# Patient Record
Sex: FEMALE | Race: WHITE | NOT HISPANIC OR LATINO | URBAN - METROPOLITAN AREA
[De-identification: names, ages, dates, MRNs, and addresses within clinical notes are randomized per-mention and may not be internally consistent; named-entity substitution may affect disease eponyms.]

---

## 2017-01-23 ENCOUNTER — APPOINTMENT (RX ONLY)
Dept: URBAN - METROPOLITAN AREA CLINIC 104 | Facility: CLINIC | Age: 67
Setting detail: DERMATOLOGY
End: 2017-01-23

## 2017-01-23 DIAGNOSIS — L72.0 EPIDERMAL CYST: ICD-10-CM

## 2017-01-23 DIAGNOSIS — D18.0 HEMANGIOMA: ICD-10-CM

## 2017-01-23 PROBLEM — D18.01 HEMANGIOMA OF SKIN AND SUBCUTANEOUS TISSUE: Status: ACTIVE | Noted: 2017-01-23

## 2017-01-23 PROCEDURE — 99213 OFFICE O/P EST LOW 20 MIN: CPT

## 2017-01-23 PROCEDURE — ? ELECTRODESICCATION (COSMETIC)

## 2017-01-23 PROCEDURE — ? PRESCRIPTION

## 2017-01-23 RX ORDER — TRETINOIN 0.25 MG/G
CREAM TOPICAL QHS
Qty: 1 | Refills: 2 | Status: ERX | COMMUNITY
Start: 2017-01-23

## 2017-01-23 RX ADMIN — TRETINOIN APPLY: 0.25 CREAM TOPICAL at 00:00

## 2017-01-23 ASSESSMENT — LOCATION SIMPLE DESCRIPTION DERM
LOCATION SIMPLE: RIGHT CHEEK
LOCATION SIMPLE: GLABELLA

## 2017-01-23 ASSESSMENT — LOCATION DETAILED DESCRIPTION DERM
LOCATION DETAILED: GLABELLA
LOCATION DETAILED: RIGHT INFERIOR MEDIAL MALAR CHEEK
LOCATION DETAILED: RIGHT MEDIAL MALAR CHEEK

## 2017-01-23 ASSESSMENT — LOCATION ZONE DERM: LOCATION ZONE: FACE

## 2017-01-23 NOTE — PROCEDURE: ELECTRODESICCATION (COSMETIC)
Anesthesia Volume In Cc: 0
Detail Level: Detailed
Consent: The patient's consent was obtained including but not limited to risks of crusting, scabbing, blistering, scarring, darker or lighter pigmentary change, recurrence, incomplete removal and infection.
Post-Care Instructions: I reviewed with the patient in detail post-care instructions. Patient is to wear sunprotection, and avoid picking at any of the treated lesions. Pt may apply Vaseline to crusted or scabbing areas
Dominguez: 1

## 2017-01-23 NOTE — PROCEDURE: MIPS QUALITY
Detail Level: Detailed
Quality 111:Pneumonia Vaccination Status For Older Adults: Pneumococcal Vaccination Previously Received
Quality 110: Preventive Care And Screening: Influenza Immunization: Influenza immunization was not ordered or administered, reason not given

## 2017-01-30 ENCOUNTER — GENERIC CONVERSION - ENCOUNTER (OUTPATIENT)
Dept: OTHER | Facility: OTHER | Age: 67
End: 2017-01-30

## 2017-01-31 ENCOUNTER — ALLSCRIPTS OFFICE VISIT (OUTPATIENT)
Dept: OTHER | Facility: OTHER | Age: 67
End: 2017-01-31

## 2017-02-22 ENCOUNTER — ALLSCRIPTS OFFICE VISIT (OUTPATIENT)
Dept: OTHER | Facility: OTHER | Age: 67
End: 2017-02-22

## 2017-08-04 ENCOUNTER — ALLSCRIPTS OFFICE VISIT (OUTPATIENT)
Dept: OTHER | Facility: OTHER | Age: 67
End: 2017-08-04

## 2017-09-05 ENCOUNTER — TRANSCRIBE ORDERS (OUTPATIENT)
Dept: MAMMOGRAPHY | Facility: CLINIC | Age: 67
End: 2017-09-05

## 2017-09-05 DIAGNOSIS — R92.8 ABNORMAL MAMMOGRAM, UNSPECIFIED: Primary | ICD-10-CM

## 2017-09-07 ENCOUNTER — HOSPITAL ENCOUNTER (OUTPATIENT)
Dept: MAMMOGRAPHY | Facility: CLINIC | Age: 67
Discharge: HOME | End: 2017-09-07
Payer: COMMERCIAL

## 2017-09-07 ENCOUNTER — HOSPITAL ENCOUNTER (OUTPATIENT)
Dept: ULTRASOUND IMAGING | Facility: CLINIC | Age: 67
Discharge: HOME | End: 2017-09-07
Payer: COMMERCIAL

## 2017-09-07 DIAGNOSIS — R92.2 INCONCLUSIVE MAMMOGRAM: ICD-10-CM

## 2017-09-07 DIAGNOSIS — R92.8 ABNORMAL MAMMOGRAM, UNSPECIFIED: ICD-10-CM

## 2017-09-07 PROCEDURE — 77063 BREAST TOMOSYNTHESIS BI: CPT

## 2017-09-07 PROCEDURE — 76642 ULTRASOUND BREAST LIMITED: CPT

## 2017-09-07 PROCEDURE — G0202 SCR MAMMO BI INCL CAD: HCPCS

## 2017-11-17 ENCOUNTER — GENERIC CONVERSION - ENCOUNTER (OUTPATIENT)
Dept: OTHER | Facility: OTHER | Age: 67
End: 2017-11-17

## 2017-12-02 ENCOUNTER — GENERIC CONVERSION - ENCOUNTER (OUTPATIENT)
Dept: OTHER | Facility: OTHER | Age: 67
End: 2017-12-02

## 2017-12-02 ENCOUNTER — GENERIC CONVERSION - ENCOUNTER (OUTPATIENT)
Dept: FAMILY MEDICINE CLINIC | Facility: CLINIC | Age: 67
End: 2017-12-02

## 2018-01-12 VITALS
BODY MASS INDEX: 43.36 KG/M2 | DIASTOLIC BLOOD PRESSURE: 82 MMHG | WEIGHT: 254 LBS | SYSTOLIC BLOOD PRESSURE: 138 MMHG | HEIGHT: 64 IN

## 2018-01-13 VITALS
BODY MASS INDEX: 42.85 KG/M2 | WEIGHT: 251 LBS | DIASTOLIC BLOOD PRESSURE: 82 MMHG | HEIGHT: 64 IN | SYSTOLIC BLOOD PRESSURE: 126 MMHG

## 2018-01-14 VITALS
BODY MASS INDEX: 44.73 KG/M2 | WEIGHT: 262 LBS | SYSTOLIC BLOOD PRESSURE: 122 MMHG | DIASTOLIC BLOOD PRESSURE: 64 MMHG | HEIGHT: 64 IN

## 2018-01-14 VITALS
HEIGHT: 64 IN | BODY MASS INDEX: 43.36 KG/M2 | DIASTOLIC BLOOD PRESSURE: 82 MMHG | SYSTOLIC BLOOD PRESSURE: 132 MMHG | WEIGHT: 254 LBS

## 2018-01-29 DIAGNOSIS — K21.9 GASTROESOPHAGEAL REFLUX DISEASE WITHOUT ESOPHAGITIS: Primary | ICD-10-CM

## 2018-01-29 RX ORDER — LANSOPRAZOLE 30 MG/1
30 CAPSULE, DELAYED RELEASE ORAL DAILY
Qty: 90 CAPSULE | Refills: 1 | Status: SHIPPED | OUTPATIENT
Start: 2018-01-29 | End: 2018-04-03 | Stop reason: SDUPTHER

## 2018-01-29 RX ORDER — LANSOPRAZOLE 30 MG/1
1 CAPSULE, DELAYED RELEASE ORAL DAILY
COMMUNITY
End: 2018-01-29 | Stop reason: SDUPTHER

## 2018-02-01 ENCOUNTER — OFFICE VISIT (OUTPATIENT)
Dept: FAMILY MEDICINE CLINIC | Facility: CLINIC | Age: 68
End: 2018-02-01

## 2018-02-01 DIAGNOSIS — A69.20 LYME DISEASE: ICD-10-CM

## 2018-02-01 DIAGNOSIS — E03.9 HYPOTHYROIDISM, UNSPECIFIED TYPE: Primary | ICD-10-CM

## 2018-02-01 LAB — TSH SERPL DL<=0.05 MIU/L-ACNC: 2.97 UIU/ML (ref 0.36–3.74)

## 2018-02-01 PROCEDURE — 86617 LYME DISEASE ANTIBODY: CPT

## 2018-02-01 PROCEDURE — 36415 COLL VENOUS BLD VENIPUNCTURE: CPT

## 2018-02-01 PROCEDURE — 86618 LYME DISEASE ANTIBODY: CPT | Performed by: PHYSICIAN ASSISTANT

## 2018-02-01 PROCEDURE — 84443 ASSAY THYROID STIM HORMONE: CPT | Performed by: PHYSICIAN ASSISTANT

## 2018-02-01 RX ORDER — CLONAZEPAM 0.5 MG/1
1 TABLET ORAL DAILY PRN
COMMUNITY
End: 2018-02-12 | Stop reason: SDUPTHER

## 2018-02-01 RX ORDER — FEXOFENADINE HCL 180 MG/1
180 TABLET ORAL DAILY
COMMUNITY
End: 2018-09-05 | Stop reason: ALTCHOICE

## 2018-02-01 RX ORDER — EPINEPHRINE 0.3 MG/.3ML
INJECTION SUBCUTANEOUS
COMMUNITY

## 2018-02-01 RX ORDER — ALBUTEROL SULFATE 90 UG/1
2 AEROSOL, METERED RESPIRATORY (INHALATION) EVERY 4 HOURS PRN
COMMUNITY
End: 2018-08-21 | Stop reason: SDUPTHER

## 2018-02-01 RX ORDER — TOLTERODINE 2 MG/1
CAPSULE, EXTENDED RELEASE ORAL
COMMUNITY
End: 2018-02-12 | Stop reason: ALTCHOICE

## 2018-02-01 RX ORDER — VENLAFAXINE HYDROCHLORIDE 75 MG/1
1 CAPSULE, EXTENDED RELEASE ORAL DAILY
COMMUNITY
End: 2018-03-12 | Stop reason: SDUPTHER

## 2018-02-02 LAB
B BURGDOR IGG SER IA-ACNC: 3.84
B BURGDOR IGM SER IA-ACNC: 2.42

## 2018-02-03 LAB
B BURGDOR IGG PATRN SER IB-IMP: POSITIVE
B BURGDOR IGM PATRN SER IB-IMP: NEGATIVE
B BURGDOR18KD IGG SER QL IB: PRESENT
B BURGDOR23KD IGG SER QL IB: ABNORMAL
B BURGDOR23KD IGM SER QL IB: ABNORMAL
B BURGDOR28KD IGG SER QL IB: PRESENT
B BURGDOR30KD IGG SER QL IB: ABNORMAL
B BURGDOR39KD IGG SER QL IB: PRESENT
B BURGDOR39KD IGM SER QL IB: ABNORMAL
B BURGDOR41KD IGG SER QL IB: PRESENT
B BURGDOR41KD IGM SER QL IB: ABNORMAL
B BURGDOR45KD IGG SER QL IB: PRESENT
B BURGDOR58KD IGG SER QL IB: PRESENT
B BURGDOR66KD IGG SER QL IB: PRESENT
B BURGDOR93KD IGG SER QL IB: ABNORMAL

## 2018-02-07 NOTE — PROGRESS NOTES
Patient called about Lyme results, which were requested by Dr Svetlana Ruiz at [facility]  She has an appointment with him today  We will forward him the results and she is to bring this up with him

## 2018-02-09 ENCOUNTER — TELEPHONE (OUTPATIENT)
Dept: FAMILY MEDICINE CLINIC | Facility: CLINIC | Age: 68
End: 2018-02-09

## 2018-02-09 DIAGNOSIS — A69.20 LYME DISEASE: Primary | ICD-10-CM

## 2018-02-09 NOTE — TELEPHONE ENCOUNTER
Order performed   Please call and tell patient to set up appointment with Dr Susu Gallegos or one of his associates

## 2018-02-12 ENCOUNTER — OFFICE VISIT (OUTPATIENT)
Dept: FAMILY MEDICINE CLINIC | Facility: CLINIC | Age: 68
End: 2018-02-12
Payer: COMMERCIAL

## 2018-02-12 VITALS
WEIGHT: 250 LBS | HEART RATE: 90 BPM | OXYGEN SATURATION: 97 % | SYSTOLIC BLOOD PRESSURE: 122 MMHG | BODY MASS INDEX: 42.68 KG/M2 | DIASTOLIC BLOOD PRESSURE: 78 MMHG | HEIGHT: 64 IN

## 2018-02-12 DIAGNOSIS — F41.1 GENERALIZED ANXIETY DISORDER: Primary | ICD-10-CM

## 2018-02-12 DIAGNOSIS — E03.9 HYPOTHYROIDISM, UNSPECIFIED TYPE: ICD-10-CM

## 2018-02-12 DIAGNOSIS — I10 HYPERTENSION, BENIGN: ICD-10-CM

## 2018-02-12 DIAGNOSIS — A69.20 LYME DISEASE: ICD-10-CM

## 2018-02-12 DIAGNOSIS — M19.90 OSTEOARTHRITIS, UNSPECIFIED OSTEOARTHRITIS TYPE, UNSPECIFIED SITE: ICD-10-CM

## 2018-02-12 PROBLEM — F32.A DEPRESSION: Status: ACTIVE | Noted: 2017-01-30

## 2018-02-12 PROBLEM — R53.83 MALAISE AND FATIGUE: Status: ACTIVE | Noted: 2017-01-31

## 2018-02-12 PROBLEM — M79.671 PAIN, FOOT, CHRONIC, RIGHT: Status: ACTIVE | Noted: 2017-02-22

## 2018-02-12 PROBLEM — R00.2 PALPITATIONS: Status: ACTIVE | Noted: 2017-01-30

## 2018-02-12 PROBLEM — G62.9 NEUROPATHY: Status: ACTIVE | Noted: 2017-01-30

## 2018-02-12 PROBLEM — J30.2 SEASONAL ALLERGIES: Status: ACTIVE | Noted: 2017-01-30

## 2018-02-12 PROBLEM — R53.81 MALAISE AND FATIGUE: Status: ACTIVE | Noted: 2017-01-31

## 2018-02-12 PROBLEM — J45.20 MILD INTERMITTENT ASTHMA WITHOUT COMPLICATION: Status: ACTIVE | Noted: 2017-01-30

## 2018-02-12 PROBLEM — N32.81 OVERACTIVE BLADDER: Status: ACTIVE | Noted: 2017-01-30

## 2018-02-12 PROBLEM — G43.909 MIGRAINE: Status: ACTIVE | Noted: 2017-01-30

## 2018-02-12 PROBLEM — E78.2 MIXED HYPERLIPIDEMIA: Status: ACTIVE | Noted: 2017-01-30

## 2018-02-12 PROBLEM — G89.29 PAIN, FOOT, CHRONIC, RIGHT: Status: ACTIVE | Noted: 2017-02-22

## 2018-02-12 PROCEDURE — 99214 OFFICE O/P EST MOD 30 MIN: CPT | Performed by: PHYSICIAN ASSISTANT

## 2018-02-12 RX ORDER — BUTALBITAL, ACETAMINOPHEN AND CAFFEINE 50; 325; 40 MG/1; MG/1; MG/1
1 TABLET ORAL EVERY 4 HOURS PRN
Refills: 0 | COMMUNITY
Start: 2018-01-19 | End: 2018-09-05 | Stop reason: ALTCHOICE

## 2018-02-12 RX ORDER — ONDANSETRON 4 MG/1
1 TABLET, FILM COATED ORAL EVERY 6 HOURS PRN
Refills: 0 | COMMUNITY
Start: 2017-12-27 | End: 2018-05-22 | Stop reason: SDUPTHER

## 2018-02-12 RX ORDER — OLOPATADINE HYDROCHLORIDE 1 MG/ML
1 SOLUTION/ DROPS OPHTHALMIC 2 TIMES DAILY
COMMUNITY
End: 2019-03-04 | Stop reason: ALTCHOICE

## 2018-02-12 RX ORDER — IBUPROFEN 600 MG/1
600 TABLET ORAL 3 TIMES DAILY
Qty: 30 TABLET | Refills: 0 | Status: SHIPPED | OUTPATIENT
Start: 2018-02-12 | End: 2018-03-29 | Stop reason: SDUPTHER

## 2018-02-12 RX ORDER — LEVOTHYROXINE SODIUM 0.03 MG/1
25 TABLET ORAL DAILY
COMMUNITY
Start: 2012-04-09 | End: 2018-05-29 | Stop reason: SDUPTHER

## 2018-02-12 RX ORDER — GABAPENTIN 300 MG/1
1 CAPSULE ORAL 3 TIMES DAILY
COMMUNITY
Start: 2016-02-17

## 2018-02-12 RX ORDER — B-COMPLEX WITH VITAMIN C
1 TABLET ORAL DAILY
COMMUNITY
End: 2022-06-14

## 2018-02-12 RX ORDER — IBUPROFEN 600 MG/1
1 TABLET ORAL 3 TIMES DAILY
COMMUNITY
Start: 2015-07-09 | End: 2018-02-12 | Stop reason: SDUPTHER

## 2018-02-12 RX ORDER — CLONAZEPAM 0.5 MG/1
0.5 TABLET ORAL DAILY PRN
Qty: 20 TABLET | Refills: 0 | Status: SHIPPED | OUTPATIENT
Start: 2018-02-12 | End: 2018-05-21 | Stop reason: SDUPTHER
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
## 2018-03-12 DIAGNOSIS — F33.1 MODERATE EPISODE OF RECURRENT MAJOR DEPRESSIVE DISORDER (HCC): Primary | ICD-10-CM

## 2018-03-12 RX ORDER — VENLAFAXINE HYDROCHLORIDE 75 MG/1
75 CAPSULE, EXTENDED RELEASE ORAL DAILY
Qty: 30 CAPSULE | Refills: 1 | Status: SHIPPED | OUTPATIENT
Start: 2018-03-12 | End: 2018-05-21 | Stop reason: SDUPTHER

## 2018-03-28 ENCOUNTER — TELEPHONE (OUTPATIENT)
Dept: FAMILY MEDICINE CLINIC | Facility: CLINIC | Age: 68
End: 2018-03-28

## 2018-03-28 NOTE — TELEPHONE ENCOUNTER
Mina Whittington called to find out the name of the physician assistant who works under Dr Demetri Sutherland  She is aware that Dr Demetri Sutherland no longer works at [facility]  She needed Chantel's name to put on a form at [facility]  She will contact us in the near future regarding appointment set up   Tegan Ghotra

## 2018-03-29 DIAGNOSIS — M19.90 OSTEOARTHRITIS, UNSPECIFIED OSTEOARTHRITIS TYPE, UNSPECIFIED SITE: ICD-10-CM

## 2018-03-29 RX ORDER — IBUPROFEN 600 MG/1
600 TABLET ORAL 3 TIMES DAILY PRN
Qty: 180 TABLET | Refills: 0 | Status: SHIPPED | OUTPATIENT
Start: 2018-03-29 | End: 2018-04-03 | Stop reason: SDUPTHER

## 2018-04-03 ENCOUNTER — OFFICE VISIT (OUTPATIENT)
Dept: FAMILY MEDICINE CLINIC | Facility: CLINIC | Age: 68
End: 2018-04-03
Payer: COMMERCIAL

## 2018-04-03 VITALS
WEIGHT: 245 LBS | DIASTOLIC BLOOD PRESSURE: 62 MMHG | SYSTOLIC BLOOD PRESSURE: 118 MMHG | OXYGEN SATURATION: 98 % | HEART RATE: 75 BPM | BODY MASS INDEX: 39.37 KG/M2 | HEIGHT: 66 IN

## 2018-04-03 DIAGNOSIS — E03.9 HYPOTHYROIDISM, UNSPECIFIED TYPE: Primary | ICD-10-CM

## 2018-04-03 DIAGNOSIS — F41.1 GENERALIZED ANXIETY DISORDER: ICD-10-CM

## 2018-04-03 DIAGNOSIS — K21.9 GASTROESOPHAGEAL REFLUX DISEASE WITHOUT ESOPHAGITIS: ICD-10-CM

## 2018-04-03 DIAGNOSIS — I10 HYPERTENSION, BENIGN: ICD-10-CM

## 2018-04-03 DIAGNOSIS — M19.90 OSTEOARTHRITIS, UNSPECIFIED OSTEOARTHRITIS TYPE, UNSPECIFIED SITE: ICD-10-CM

## 2018-04-03 PROCEDURE — 99214 OFFICE O/P EST MOD 30 MIN: CPT | Performed by: PHYSICIAN ASSISTANT

## 2018-04-03 RX ORDER — IBUPROFEN 600 MG/1
600 TABLET ORAL 3 TIMES DAILY PRN
Qty: 180 TABLET | Refills: 3 | Status: SHIPPED | OUTPATIENT
Start: 2018-04-03 | End: 2018-08-26 | Stop reason: HOSPADM

## 2018-04-03 RX ORDER — LANSOPRAZOLE 30 MG/1
30 CAPSULE, DELAYED RELEASE ORAL DAILY
Qty: 90 CAPSULE | Refills: 3 | Status: SHIPPED | OUTPATIENT
Start: 2018-04-03 | End: 2018-04-11 | Stop reason: SINTOL

## 2018-04-03 RX ORDER — ELETRIPTAN HYDROBROMIDE 40 MG/1
40 TABLET, FILM COATED ORAL DAILY PRN
COMMUNITY
End: 2018-05-22 | Stop reason: SDUPTHER

## 2018-04-03 RX ORDER — TIZANIDINE HYDROCHLORIDE 2 MG/1
2 CAPSULE, GELATIN COATED ORAL DAILY
Refills: 0 | COMMUNITY
Start: 2018-02-19 | End: 2022-06-14

## 2018-04-03 RX ORDER — BUDESONIDE AND FORMOTEROL FUMARATE DIHYDRATE 80; 4.5 UG/1; UG/1
1 AEROSOL RESPIRATORY (INHALATION) DAILY
COMMUNITY

## 2018-04-03 NOTE — ASSESSMENT & PLAN NOTE
Patient continues to have significant anxiety  It was discussed to increase Effexor to 150 mg at last visit but patient forgot to do this  She is to start this increased dose as soon as possible  Continue clonazepam for breakthrough symptoms
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
## 2018-04-03 NOTE — ASSESSMENT & PLAN NOTE
Patient reports she did much better on prescription strength Prevacid than on over-the-counter generic Prevacid  Refill ordered    She is to return if she does not experience relief of symptoms in 2 weeks

## 2018-04-11 DIAGNOSIS — K21.9 GASTROESOPHAGEAL REFLUX DISEASE WITHOUT ESOPHAGITIS: Primary | ICD-10-CM

## 2018-04-11 RX ORDER — LANSOPRAZOLE 30 MG
30 CAPSULE,DELAYED RELEASE (ENTERIC COATED) ORAL DAILY
Qty: 90 CAPSULE | Refills: 1 | Status: SHIPPED | OUTPATIENT
Start: 2018-04-11 | End: 2018-10-28 | Stop reason: SDUPTHER

## 2018-04-11 NOTE — TELEPHONE ENCOUNTER
Pt called regarding recent refill of prevacid  [facility] gave her the generic which makes her vomit  Refilled brand [facility] to [facility]  Pt also requested referral for GI appt for today   Same done by Greg Lai

## 2018-05-05 ENCOUNTER — HOSPITAL ENCOUNTER (EMERGENCY)
Facility: HOSPITAL | Age: 68
Discharge: HOME | End: 2018-05-05
Attending: EMERGENCY MEDICINE | Admitting: EMERGENCY MEDICINE
Payer: COMMERCIAL

## 2018-05-05 VITALS
RESPIRATION RATE: 16 BRPM | SYSTOLIC BLOOD PRESSURE: 167 MMHG | OXYGEN SATURATION: 97 % | HEART RATE: 77 BPM | TEMPERATURE: 98.3 F | WEIGHT: 245 LBS | BODY MASS INDEX: 39.54 KG/M2 | DIASTOLIC BLOOD PRESSURE: 89 MMHG

## 2018-05-05 DIAGNOSIS — F43.29 STRESS AND ADJUSTMENT REACTION: Primary | ICD-10-CM

## 2018-05-05 LAB
ALBUMIN SERPL BCP-MCNC: 3.6 G/DL (ref 3.5–5)
ALP SERPL-CCNC: 126 U/L (ref 46–116)
ALT SERPL W P-5'-P-CCNC: 27 U/L (ref 12–78)
ANION GAP SERPL CALCULATED.3IONS-SCNC: 8 MMOL/L (ref 4–13)
AST SERPL W P-5'-P-CCNC: 22 U/L (ref 5–45)
BASOPHILS # BLD AUTO: 0.03 THOUSANDS/ΜL (ref 0–0.1)
BASOPHILS NFR BLD AUTO: 0 % (ref 0–1)
BILIRUB SERPL-MCNC: 0.37 MG/DL (ref 0.2–1)
BUN SERPL-MCNC: 11 MG/DL (ref 5–25)
CALCIUM SERPL-MCNC: 8.8 MG/DL (ref 8.3–10.1)
CHLORIDE SERPL-SCNC: 106 MMOL/L (ref 100–108)
CO2 SERPL-SCNC: 23 MMOL/L (ref 21–32)
CREAT SERPL-MCNC: 0.8 MG/DL (ref 0.6–1.3)
EOSINOPHIL # BLD AUTO: 0.35 THOUSAND/ΜL (ref 0–0.61)
EOSINOPHIL NFR BLD AUTO: 5 % (ref 0–6)
ERYTHROCYTE [DISTWIDTH] IN BLOOD BY AUTOMATED COUNT: 14.3 % (ref 11.6–15.1)
ETHANOL EXG-MCNC: 0 MG/DL
GFR SERPL CREATININE-BSD FRML MDRD: 75 ML/MIN/1.73SQ M
GLUCOSE SERPL-MCNC: 162 MG/DL (ref 65–140)
HCT VFR BLD AUTO: 41.3 % (ref 34.8–46.1)
HGB BLD-MCNC: 13.8 G/DL (ref 11.5–15.4)
LYMPHOCYTES # BLD AUTO: 2.91 THOUSANDS/ΜL (ref 0.6–4.47)
LYMPHOCYTES NFR BLD AUTO: 38 % (ref 14–44)
MCH RBC QN AUTO: 30.5 PG (ref 26.8–34.3)
MCHC RBC AUTO-ENTMCNC: 33.4 G/DL (ref 31.4–37.4)
MCV RBC AUTO: 91 FL (ref 82–98)
MONOCYTES # BLD AUTO: 0.61 THOUSAND/ΜL (ref 0.17–1.22)
MONOCYTES NFR BLD AUTO: 8 % (ref 4–12)
NEUTROPHILS # BLD AUTO: 3.74 THOUSANDS/ΜL (ref 1.85–7.62)
NEUTS SEG NFR BLD AUTO: 49 % (ref 43–75)
NRBC BLD AUTO-RTO: 0 /100 WBCS
PLATELET # BLD AUTO: 224 THOUSANDS/UL (ref 149–390)
PMV BLD AUTO: 10.9 FL (ref 8.9–12.7)
POTASSIUM SERPL-SCNC: 3.8 MMOL/L (ref 3.5–5.3)
PROT SERPL-MCNC: 7.5 G/DL (ref 6.4–8.2)
RBC # BLD AUTO: 4.53 MILLION/UL (ref 3.81–5.12)
SODIUM SERPL-SCNC: 137 MMOL/L (ref 136–145)
TSH SERPL DL<=0.05 MIU/L-ACNC: 2.41 UIU/ML (ref 0.36–3.74)
WBC # BLD AUTO: 7.64 THOUSAND/UL (ref 4.31–10.16)

## 2018-05-05 PROCEDURE — 99284 EMERGENCY DEPT VISIT MOD MDM: CPT

## 2018-05-05 PROCEDURE — 93005 ELECTROCARDIOGRAM TRACING: CPT | Performed by: EMERGENCY MEDICINE

## 2018-05-05 PROCEDURE — 82075 ASSAY OF BREATH ETHANOL: CPT | Performed by: EMERGENCY MEDICINE

## 2018-05-05 PROCEDURE — 80053 COMPREHEN METABOLIC PANEL: CPT | Performed by: EMERGENCY MEDICINE

## 2018-05-05 PROCEDURE — 85025 COMPLETE CBC W/AUTO DIFF WBC: CPT | Performed by: EMERGENCY MEDICINE

## 2018-05-05 PROCEDURE — 84443 ASSAY THYROID STIM HORMONE: CPT | Performed by: EMERGENCY MEDICINE

## 2018-05-05 PROCEDURE — 36415 COLL VENOUS BLD VENIPUNCTURE: CPT | Performed by: EMERGENCY MEDICINE

## 2018-05-05 RX ORDER — LISINOPRIL 5 MG/1
TABLET ORAL DAILY
COMMUNITY
End: 2018-08-01 | Stop reason: SINTOL
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
## 2018-05-05 NOTE — ED PROVIDER NOTES
History  Chief Complaint   Patient presents with    Psychiatric Evaluation     Pt was walking down [place] of ED taking her daughter's belongings to her  Her daughter is a current pt in ED  As the pt was walking buy, she was argiung on her phone became tearful and stated the she was going to drive her car off a road  The pt was asked if she needed help and was taken to an ED room for eval  She stated again when asked that she would drive her car off a road and that she was overwhelmed with the care of her daughter and   She is extremely tearful and anxious  Pt was a visitor of one of the patients (her daughter) here waiting for placement in a NH  She was bringing belongings for her daughter while arguing on the phone and it was overheard that she was going [place]  Pt started to cry and a nurse asked if she needed help  Pt states she is overwhelmed with her daughter and   She states she said it out of anger and does not actually want to drive off a mountain  History provided by:  Patient   used: No    Psychiatric Evaluation   Presenting symptoms: no agitation, no hallucinations, no self-mutilation and no suicidal thoughts    Presenting symptoms comment:  Anxiety    Onset quality:  Sudden  Progression:  Unchanged  Chronicity:  New  Context: not alcohol use and not drug abuse    Relieved by:  None tried  Worsened by:  Nothing  Ineffective treatments:  None tried  Associated symptoms: anxiety    Associated symptoms: no headaches    Risk factors: hx of mental illness        Prior to Admission Medications   Prescriptions Last Dose Informant Patient Reported? Taking?    Calcium Carbonate-Vitamin D (CALCIUM 600+D) 600-200 MG-UNIT TABS 5/5/2018 at 0900  Yes Yes   Sig: Take 1 tablet by mouth daily   EPINEPHrine (EPIPEN) 0 3 mg/0 3 mL SOAJ   Yes No   Sig: Inject into the shoulder, thigh, or buttocks   Linaclotide (LINZESS) 145 MCG CAPS 5/5/2018 at 0900 Yes Yes   Sig: Take 1 capsule (145mcg) by mouth once daily     PREVACID 30 MG capsule 5/5/2018 at 0900  No Yes   Sig: Take 1 capsule (30 mg total) by mouth daily   TiZANidine (ZANAFLEX) 2 MG capsule 5/5/2018 at 0900  Yes Yes   Sig: Take 2 mg by mouth daily   albuterol (PROVENTIL HFA,VENTOLIN HFA) 90 mcg/act inhaler   Yes No   Sig: Inhale 2 puffs every 4 (four) hours as needed for wheezing     budesonide-formoterol (SYMBICORT) 80-4 5 MCG/ACT inhaler 5/5/2018 at 0900  Yes Yes   Sig: Inhale 1 puff daily   butalbital-acetaminophen-caffeine (FIORICET,ESGIC) -40 mg per tablet   Yes No   Sig: Take 1 tablet by mouth every 4 (four) hours as needed   clonazePAM (KlonoPIN) 0 5 mg tablet 5/5/2018 at 0900  No Yes   Sig: Take 1 tablet (0 5 mg total) by mouth daily as needed for anxiety   eletriptan (RELPAX) 40 MG tablet   Yes No   Sig: Take 40 mg by mouth daily as needed   fexofenadine (ALLEGRA ALLERGY) 180 MG tablet   Yes No   Sig: Take 180 mg by mouth daily     gabapentin (NEURONTIN) 300 mg capsule 5/5/2018 at 0900  Yes Yes   Sig: Take 1 capsule by mouth 3 (three) times a day   ibuprofen (MOTRIN) 600 mg tablet   No No   Sig: Take 1 tablet (600 mg total) by mouth 3 (three) times a day as needed for mild pain   levothyroxine 25 mcg tablet 5/5/2018 at 0900  Yes Yes   Sig: Take 25 mcg by mouth daily   lisinopril (ZESTRIL) 5 mg tablet 5/5/2018 at 0900  Yes Yes   Sig: Daily   olopatadine (PATANOL) 0 1 % ophthalmic solution 5/5/2018 at 0900  Yes Yes   Sig: Administer 1 drop to both eyes 2 (two) times a day     ondansetron (ZOFRAN) 4 mg tablet   Yes No   Sig: Take 1 tablet by mouth every 6 (six) hours as needed for headaches or nausea   venlafaxine (EFFEXOR XR) 75 mg 24 hr capsule 5/5/2018 at 0900  No Yes   Sig: Take 1 capsule (75 mg total) by mouth daily      Facility-Administered Medications: None       Past Medical History:   Diagnosis Date    Anxiety     Arthritis     Depression     Disease of thyroid gland     GERD (gastroesophageal reflux disease)        No past surgical history on file  No family history on file  I have reviewed and agree with the history as documented  Social History   Substance Use Topics    Smoking status: Never Smoker    Smokeless tobacco: Never Used    Alcohol use Not on file        Review of Systems   Constitutional: Negative for chills and fever  Eyes: Negative for visual disturbance  Gastrointestinal: Negative for diarrhea, nausea and vomiting  Skin: Negative for rash  Neurological: Negative for tremors, facial asymmetry, speech difficulty, weakness, numbness and headaches  Psychiatric/Behavioral: Negative for agitation, behavioral problems, confusion, decreased concentration, dysphoric mood, hallucinations, self-injury, sleep disturbance and suicidal ideas  The patient is nervous/anxious  The patient is not hyperactive  All other systems reviewed and are negative  Physical Exam  ED Triage Vitals   Temperature Pulse Respirations Blood Pressure SpO2   05/05/18 1011 05/05/18 1011 05/05/18 1011 05/05/18 1011 05/05/18 1011   98 3 °F (36 8 °C) 88 20 (!) 195/92 97 %      Temp Source Heart Rate Source Patient Position - Orthostatic VS BP Location FiO2 (%)   05/05/18 1011 05/05/18 1416 05/05/18 1416 05/05/18 1416 --   Oral Monitor Lying Right arm       Pain Score       05/05/18 1011       No Pain           Orthostatic Vital Signs  Vitals:    05/05/18 1011 05/05/18 1416   BP: (!) 195/92 167/89   Pulse: 88 77   Patient Position - Orthostatic VS:  Lying       Physical Exam   Constitutional: She is oriented to person, place, and time  She appears well-developed and well-nourished  Non-toxic appearance  She does not have a sickly appearance  She does not appear ill  No distress  HENT:   Head: Normocephalic and atraumatic  Mouth/Throat: Oropharynx is clear and moist    Eyes: EOM are normal  Pupils are equal, round, and reactive to light  Neck: Normal range of motion  Neck supple  No JVD present  Cardiovascular: Normal rate, regular rhythm, S1 normal, S2 normal, normal heart sounds, intact distal pulses and normal pulses  Exam reveals no gallop and no friction rub  No murmur heard  Pulmonary/Chest: Effort normal and breath sounds normal  No respiratory distress  She has no wheezes  She has no rales  She exhibits no tenderness  Abdominal: Soft  Bowel sounds are normal  She exhibits no distension  There is no tenderness  There is no rebound and no guarding  Neurological: She is alert and oriented to person, place, and time  She has normal strength  No cranial nerve deficit or sensory deficit  Skin: Skin is warm and dry  No rash noted  She is not diaphoretic  No pallor  Psychiatric: Her mood appears anxious  Her speech is not rapid and/or pressured  She is not aggressive and not hyperactive  She expresses no homicidal and no suicidal ideation  Tearful   Nursing note and vitals reviewed  ED Medications  Medications - No data to display    Diagnostic Studies  Results Reviewed     Procedure Component Value Units Date/Time    Comprehensive metabolic panel [[id number]]  (Abnormal) Collected:  05/05/18 1032    Lab Status:  Final result Specimen:  Blood from Arm, Left Updated:  05/05/18 1107     Sodium 137 mmol/L      Potassium 3 8 mmol/L      Chloride 106 mmol/L      CO2 23 mmol/L      Anion Gap 8 mmol/L      BUN 11 mg/dL      Creatinine 0 80 mg/dL      Glucose 162 (H) mg/dL      Calcium 8 8 mg/dL      AST 22 U/L      ALT 27 U/L      Alkaline Phosphatase 126 (H) U/L      Total Protein 7 5 g/dL      Albumin 3 6 g/dL      Total Bilirubin 0 37 mg/dL      eGFR 75 ml/min/1 73sq m     Narrative:         National Kidney Disease Education Program recommendations are as follows:  GFR calculation is accurate only with a steady state creatinine  Chronic Kidney disease less than 60 ml/min/1 73 sq  meters  Kidney failure less than 15 ml/min/1 73 sq  meters      TSH, 3rd generation with T4 reflex [[id number]]  (Normal) Collected:  05/05/18 1032    Lab Status:  Final result Specimen:  Blood from Arm, Left Updated:  05/05/18 1107     TSH 3RD GENERATON 2 406 uIU/mL     Narrative:         Patients undergoing fluorescein dye angiography may retain small amounts of fluorescein in the body for 48-72 hours post procedure  Samples containing fluorescein can produce falsely depressed TSH values  If the patient had this procedure,a specimen should be resubmitted post fluorescein clearance            The recommended reference ranges for TSH during pregnancy are as follows:  First trimester 0 1 to 2 5 uIU/mL  Second trimester  0 2 to 3 0 uIU/mL  Third trimester 0 3 to 3 0 uIU/m      CBC and differential [[id number]] Collected:  05/05/18 1032    Lab Status:  Final result Specimen:  Blood from Arm, Left Updated:  05/05/18 1047     WBC 7 64 Thousand/uL      RBC 4 53 Million/uL      Hemoglobin 13 8 g/dL      Hematocrit 41 3 %      MCV 91 fL      MCH 30 5 pg      MCHC 33 4 g/dL      RDW 14 3 %      MPV 10 9 fL      Platelets 308 Thousands/uL      nRBC 0 /100 WBCs      Neutrophils Relative 49 %      Lymphocytes Relative 38 %      Monocytes Relative 8 %      Eosinophils Relative 5 %      Basophils Relative 0 %      Neutrophils Absolute 3 74 Thousands/µL      Lymphocytes Absolute 2 91 Thousands/µL      Monocytes Absolute 0 61 Thousand/µL      Eosinophils Absolute 0 35 Thousand/µL      Basophils Absolute 0 03 Thousands/µL     POCT alcohol breath test [[id number]]  (Normal) Resulted:  05/05/18 1032    Lab Status:  Final result Updated:  05/05/18 1032     EXTBreath Alcohol 0 000    Rapid drug screen, urine [[id number]]     Lab Status:  No result Specimen:  Urine     POCT urinalysis dipstick [[id number]]     Lab Status:  No result Specimen:  Urine                  No orders to display              Procedures  ECG 12 Lead Documentation  Date/Time: 5/5/2018 10:47 AM  Performed by: [facility] [employer]  Authorized by: [employer]     Indications / Diagnosis:  Psych eval  ECG reviewed by me, the ED Provider: yes    Patient location:  Bedside  Rate:     ECG rate:  70    ECG rate assessment: normal    Rhythm:     Rhythm: sinus rhythm    Ectopy:     Ectopy: none    QRS:     QRS axis:  Normal    QRS intervals:  Normal  ST segments:     ST segments:  Normal  T waves:     T waves: normal             Phone Contacts  ED Phone Contact    ED Course                               MDM  Number of Diagnoses or Management Options  Diagnosis management comments: Pt very overwhelmed with life - Will c/s crisis  Amount and/or Complexity of Data Reviewed  Clinical lab tests: reviewed and ordered  Tests in the medicine section of CPT®: ordered and reviewed      CritCare Time    Disposition  Final diagnoses:   Stress and adjustment reaction     Time reflects when diagnosis was documented in both MDM as applicable and the Disposition within this note     Time User Action Codes Description Comment    5/5/2018  1:49 PM Justina Jarquin 46 [F43 9] Stress     5/5/2018  1:49 PM Britton, [address]  [facility] Add [F43 29] Stress and adjustment reaction     5/5/2018  1:49 PM Sarai Jarquin Modify [F43 29] Stress and adjustment reaction     5/5/2018  1:49 PM Britton, [facility] [address] [F43 9] Stress       ED Disposition     ED Disposition Condition Comment    Discharge  Jacquestrue Perez discharge to home/self care      Condition at discharge: Good        MD Documentation      Most Recent Value   Sending MD Dr Parvez Lou    None       Discharge Medication List as of 5/5/2018  1:50 PM      CONTINUE these medications which have NOT CHANGED    Details   budesonide-formoterol (SYMBICORT) 80-4 5 MCG/ACT inhaler Inhale 1 puff daily, Historical Med      Calcium Carbonate-Vitamin D (CALCIUM 600+D) 600-200 MG-UNIT TABS Take 1 tablet by mouth daily, Historical Med      clonazePAM (KlonoPIN) 0 5 mg tablet Take 1 tablet (0 5 mg total) by mouth daily as needed for anxiety, Starting Mon 2/12/2018, Print      gabapentin (NEURONTIN) 300 mg capsule Take 1 capsule by mouth 3 (three) times a day, Starting Wed 2/17/2016, Historical Med      levothyroxine 25 mcg tablet Take 25 mcg by mouth daily, Starting Mon 4/9/2012, Historical Med      Linaclotide (LINZESS) 145 MCG CAPS Take 1 capsule (145mcg) by mouth once daily  , Historical Med      lisinopril (ZESTRIL) 5 mg tablet Daily, Historical Med      olopatadine (PATANOL) 0 1 % ophthalmic solution Administer 1 drop to both eyes 2 (two) times a day  , Historical Med      PREVACID 30 MG capsule Take 1 capsule (30 mg total) by mouth daily, Starting Wed 4/11/2018, Normal      TiZANidine (ZANAFLEX) 2 MG capsule Take 2 mg by mouth daily, Starting Mon 2/19/2018, Historical Med      venlafaxine (EFFEXOR XR) 75 mg 24 hr capsule Take 1 capsule (75 mg total) by mouth daily, Starting Mon 3/12/2018, Normal      albuterol (PROVENTIL HFA,VENTOLIN HFA) 90 mcg/act inhaler Inhale 2 puffs every 4 (four) hours as needed for wheezing  , Historical Med      butalbital-acetaminophen-caffeine (FIORICET,ESGIC) -40 mg per tablet Take 1 tablet by mouth every 4 (four) hours as needed, Starting Fri 1/19/2018, Historical Med      eletriptan (RELPAX) 40 MG tablet Take 40 mg by mouth daily as needed, Historical Med      EPINEPHrine (EPIPEN) 0 3 mg/0 3 mL SOAJ Inject into the shoulder, thigh, or buttocks, Historical Med      fexofenadine (ALLEGRA ALLERGY) 180 MG tablet Take 180 mg by mouth daily  , Historical Med      ibuprofen (MOTRIN) 600 mg tablet Take 1 tablet (600 mg total) by mouth 3 (three) times a day as needed for mild pain, Starting Tue 4/3/2018, Normal      ondansetron (ZOFRAN) 4 mg tablet Take 1 tablet by mouth every 6 (six) hours as needed for headaches or nausea, Starting Wed 12/27/2017, Historical Med           No discharge procedures on file      ED Provider  Electronically Signed by           Delon Davis 22, DO  05/05/18 3479

## 2018-05-05 NOTE — ED NOTES
Has made frequent statements that her  is going to be mad that she is here  She stated since he retired and is home they have been having problems  Her daughter she cares for has extensive medical problems including hx of substance abuse and hep C with liver disease  States she is overwhelmed and physically can no longer care for her  Patient has been admitted to [facility] for depression in the past   Is not currently under any psychiatric care- only her PCP  Recently saw the PA there and told her she felt like things were getting out of control  Patient calms with interactions, but remains tearful       Cindy Phillip RN  05/05/18 [address] [place], RN  05/05/18 6821

## 2018-05-05 NOTE — DISCHARGE INSTRUCTIONS
Stress   WHAT YOU NEED TO KNOW:   Stress is a feeling of tension or strain related to the events and pressures of everyday life  Learn to cope and control your stress to help you function in a healthy way  DISCHARGE INSTRUCTIONS:   Call 911 for any of the following:   · You feel like hurting yourself or someone else  · You feel you are overwhelmed and can no longer handle things by yourself  Contact your healthcare provider if:   · You have trouble coping with your stress  · Your symptoms cause problems in your relationships  · You feel depressed  · You have trouble controlling your anger  · You have started to use alcohol, illegal drugs, or prescription medicines, or you increase your current use  · You have questions or concerns about your condition or care  Ways to manage your stress:  Learn what causes you stress  Not all stress can be avoided  Instead, change how you cope with stress by doing any of the following:  · Learn relaxation techniques, such as yoga, meditation, or listening to music  Take at least 30 minutes a day to do something you enjoy  This may include taking a bath or reading a book  · Do deep breathing exercises during times of increased stress  Sit up straight and take a slow, deep breath in through your nose  Then breathe out slowly through your mouth  Take twice as long to breathe out as you do when you breathe in  Repeat this a few times until you feel calmer or more focused  · Set realistic goals for yourself  Make a list of tasks and prioritize them  Focus on one task at a time  · Talk to someone about things that upset you  Talk to a trusted friend, family member, or support group  Try to stop yourself when you think negative, angry, or discouraging thoughts  · Take time to exercise  Start slowly, such as walking 1 to 2 blocks each day  Stretch and relax your muscles often  Ask about the best exercise plan for you       · Eat a variety of healthy foods   Healthy foods include fruits, vegetables, whole-grain breads, low-fat dairy products, beans, lean meats, and fish  Follow up with your healthcare provider as directed:  Write down your questions so you remember to ask them during your visits  © 2017 [address] [employer] Information is for End User's use only and may not be sold, redistributed or otherwise used for commercial purposes  All illustrations and images included in CareNotes® are the copyrighted property of [employer]  or [address]  The above information is an  only  It is not intended as medical advice for individual conditions or treatments  Talk to your doctor, nurse or pharmacist before following any medical regimen to see if it is safe and effective for you

## 2018-05-06 LAB
ATRIAL RATE: 70 BPM
P AXIS: 32 DEGREES
PR INTERVAL: 186 MS
QRS AXIS: 28 DEGREES
QRSD INTERVAL: 96 MS
QT INTERVAL: 414 MS
QTC INTERVAL: 447 MS
T WAVE AXIS: 78 DEGREES
VENTRICULAR RATE: 70 BPM

## 2018-05-06 PROCEDURE — 93010 ELECTROCARDIOGRAM REPORT: CPT | Performed by: INTERNAL MEDICINE

## 2018-05-21 ENCOUNTER — APPOINTMENT (RX ONLY)
Dept: URBAN - METROPOLITAN AREA CLINIC 89 | Facility: CLINIC | Age: 68
Setting detail: DERMATOLOGY
End: 2018-05-21

## 2018-05-21 DIAGNOSIS — F41.1 GENERALIZED ANXIETY DISORDER: ICD-10-CM

## 2018-05-21 DIAGNOSIS — L70.0 ACNE VULGARIS: ICD-10-CM

## 2018-05-21 DIAGNOSIS — F33.1 MODERATE EPISODE OF RECURRENT MAJOR DEPRESSIVE DISORDER (HCC): ICD-10-CM

## 2018-05-21 PROCEDURE — ? ACNE SURGERY

## 2018-05-21 PROCEDURE — 10040 EXTRACTION: CPT

## 2018-05-21 RX ORDER — CLONAZEPAM 0.5 MG/1
0.5 TABLET ORAL 2 TIMES DAILY PRN
Qty: 30 TABLET | Refills: 0 | Status: SHIPPED | OUTPATIENT
Start: 2018-05-21 | End: 2018-09-24 | Stop reason: SDUPTHER

## 2018-05-21 RX ORDER — CLONAZEPAM 0.5 MG/1
0.5 TABLET ORAL DAILY PRN
Qty: 30 TABLET | Refills: 0 | Status: SHIPPED | OUTPATIENT
Start: 2018-05-21 | End: 2018-05-21 | Stop reason: SDUPTHER

## 2018-05-21 RX ORDER — VENLAFAXINE HYDROCHLORIDE 75 MG/1
75 CAPSULE, EXTENDED RELEASE ORAL DAILY
Qty: 30 CAPSULE | Refills: 0 | Status: SHIPPED | OUTPATIENT
Start: 2018-05-21 | End: 2018-05-22

## 2018-05-21 ASSESSMENT — LOCATION SIMPLE DESCRIPTION DERM
LOCATION SIMPLE: LEFT CHEEK
LOCATION SIMPLE: RIGHT FOREHEAD
LOCATION SIMPLE: LEFT LIP
LOCATION SIMPLE: NOSE
LOCATION SIMPLE: RIGHT CHEEK

## 2018-05-21 ASSESSMENT — LOCATION ZONE DERM
LOCATION ZONE: LIP
LOCATION ZONE: NOSE
LOCATION ZONE: FACE

## 2018-05-21 ASSESSMENT — LOCATION DETAILED DESCRIPTION DERM
LOCATION DETAILED: LEFT CENTRAL MALAR CHEEK
LOCATION DETAILED: LEFT LOWER CUTANEOUS LIP
LOCATION DETAILED: RIGHT INFERIOR MEDIAL FOREHEAD
LOCATION DETAILED: NASAL DORSUM
LOCATION DETAILED: RIGHT LATERAL MALAR CHEEK

## 2018-05-21 NOTE — TELEPHONE ENCOUNTER
Pt called stating her anxiety and depression is out of control and she needs it addressed  Gave patient appointment for tomorrow May 22  Reordered prescriptions for clonazepam and venlafaxine

## 2018-05-21 NOTE — HPI: PIMPLES (ACNE)
How Severe Is Your Acne?: mild
Is This A New Presentation, Or A Follow-Up?: Acne
Additional Comments (Use Complete Sentences): Acne facial #4

## 2018-05-21 NOTE — PROCEDURE: ACNE SURGERY
Detail Level: Zone
Hemostasis: Aluminum Chloride
Render Number Of Lesions Treated: no
Extraction Method: 11 blade
Prep Text (Optional): Prior to removal the treatment areas were prepped in the usual fashion.
Post-Care Instructions: I reviewed with the patient in detail post-care instructions. Patient is to keep the treatment areas dry overnight, and then apply bacitracin twice daily until healed. Patient may apply hydrogen peroxide soaks to remove any crusting.
Consent was obtained and risks were reviewed including but not limited to scarring, infection, bleeding, scabbing, incomplete removal, and allergy to anesthesia.
Acne Type: Milial cysts

## 2018-05-22 ENCOUNTER — OFFICE VISIT (OUTPATIENT)
Dept: FAMILY MEDICINE CLINIC | Facility: CLINIC | Age: 68
End: 2018-05-22
Payer: COMMERCIAL

## 2018-05-22 VITALS
HEART RATE: 77 BPM | OXYGEN SATURATION: 83 % | BODY MASS INDEX: 39.37 KG/M2 | TEMPERATURE: 100.1 F | WEIGHT: 245 LBS | HEIGHT: 66 IN | DIASTOLIC BLOOD PRESSURE: 90 MMHG | SYSTOLIC BLOOD PRESSURE: 140 MMHG

## 2018-05-22 DIAGNOSIS — G47.33 OSA (OBSTRUCTIVE SLEEP APNEA): ICD-10-CM

## 2018-05-22 DIAGNOSIS — F41.1 GENERALIZED ANXIETY DISORDER: ICD-10-CM

## 2018-05-22 DIAGNOSIS — F33.1 MODERATE EPISODE OF RECURRENT MAJOR DEPRESSIVE DISORDER (HCC): ICD-10-CM

## 2018-05-22 DIAGNOSIS — G43.909 MIGRAINE WITHOUT STATUS MIGRAINOSUS, NOT INTRACTABLE, UNSPECIFIED MIGRAINE TYPE: Primary | ICD-10-CM

## 2018-05-22 PROCEDURE — 99213 OFFICE O/P EST LOW 20 MIN: CPT | Performed by: PHYSICIAN ASSISTANT

## 2018-05-22 RX ORDER — CLONAZEPAM 0.5 MG/1
0.5 TABLET ORAL 2 TIMES DAILY
Qty: 90 TABLET | Refills: 0 | Status: SHIPPED | OUTPATIENT
Start: 2018-05-22 | End: 2018-05-29 | Stop reason: SDUPTHER

## 2018-05-22 RX ORDER — VENLAFAXINE HYDROCHLORIDE 75 MG/1
75 CAPSULE, EXTENDED RELEASE ORAL DAILY
Qty: 30 CAPSULE | Refills: 0 | Status: CANCELLED | OUTPATIENT
Start: 2018-05-22

## 2018-05-22 RX ORDER — LEVOTHYROXINE SODIUM 0.03 MG/1
TABLET ORAL
COMMUNITY
End: 2018-06-11 | Stop reason: SDUPTHER

## 2018-05-22 RX ORDER — ONDANSETRON 4 MG/1
4 TABLET, FILM COATED ORAL EVERY 6 HOURS PRN
Qty: 20 TABLET | Refills: 5 | Status: SHIPPED | OUTPATIENT
Start: 2018-05-22

## 2018-05-22 RX ORDER — FLUTICASONE PROPIONATE 50 MCG
SPRAY, SUSPENSION (ML) NASAL
COMMUNITY
End: 2018-12-12 | Stop reason: SDUPTHER

## 2018-05-22 RX ORDER — MOMETASONE FUROATE 50 UG/1
SPRAY, METERED NASAL
COMMUNITY
End: 2018-09-05 | Stop reason: ALTCHOICE

## 2018-05-22 RX ORDER — ELETRIPTAN HYDROBROMIDE 40 MG/1
40 TABLET, FILM COATED ORAL ONCE AS NEEDED
Qty: 10 TABLET | Refills: 9 | Status: SHIPPED | OUTPATIENT
Start: 2018-05-22 | End: 2022-06-14

## 2018-05-22 RX ORDER — VENLAFAXINE HYDROCHLORIDE 150 MG/1
150 TABLET, EXTENDED RELEASE ORAL
Qty: 90 TABLET | Refills: 3 | Status: SHIPPED | OUTPATIENT
Start: 2018-05-22 | End: 2018-08-21 | Stop reason: ALTCHOICE

## 2018-05-22 RX ORDER — ELETRIPTAN HYDROBROMIDE 40 MG/1
TABLET, FILM COATED ORAL
COMMUNITY
End: 2018-05-29 | Stop reason: SDUPTHER

## 2018-05-22 RX ORDER — CYCLOSPORINE 0.5 MG/ML
EMULSION OPHTHALMIC
COMMUNITY

## 2018-05-22 RX ORDER — BUDESONIDE AND FORMOTEROL FUMARATE DIHYDRATE 80; 4.5 UG/1; UG/1
AEROSOL RESPIRATORY (INHALATION)
COMMUNITY
End: 2018-05-29 | Stop reason: SDUPTHER

## 2018-05-22 NOTE — PROGRESS NOTES
Assessment/Plan:    Depression  Flare up secondary to running out of her medications several weeks ago  I suggested that in the future if she ever notices a problem with running out of medicine she should let us know as soon as possible to prevent it from getting to this point  As she has been moderately stable when taking her medicine regularly, I believe no adjustments are necessary at this time but patient just needs to be consistent with her medications    Generalized anxiety disorder  Same as depression       Diagnoses and all orders for this visit:    Migraine without status migrainosus, not intractable, unspecified migraine type  -     ondansetron (ZOFRAN) 4 mg tablet; Take 1 tablet (4 mg total) by mouth every 6 (six) hours as needed (as needed)  -     eletriptan (RELPAX) 40 MG tablet; Take 1 tablet (40 mg total) by mouth once as needed (as needed) for up to 1 dose    Moderate episode of recurrent major depressive disorder (HCC)  -     venlafaxine 150 MG TB24; Take 1 tablet (150 mg total) by mouth daily with breakfast    ALLA (obstructive sleep apnea)  -     Cpap DME    Generalized anxiety disorder  -     clonazePAM (KlonoPIN) 0 5 mg tablet; Take 1 tablet (0 5 mg total) by mouth 2 (two) times a day    Other orders  -     fluticasone (FLONASE) 50 mcg/act nasal spray; Insert 1 spray in each nostril once daily  -     mometasone (NASONEX) 50 mcg/act nasal spray; Insert 1 spray in each nostril as needed  PRN  -     cycloSPORINE (RESTASIS) 0 05 % ophthalmic emulsion; Instill 1 drop in each eye twice daily  -     budesonide-formoterol (SYMBICORT) 80-4 5 MCG/ACT inhaler; Inhale 1 puff as needed  PRN (Use as a Rescue Inhaler, when Asthma kicks in)  -     eletriptan (RELPAX) 40 MG tablet; Take 1 tablet (40mg) by mouth as needed  PRN  -     levothyroxine (SYNTHROID) 25 mcg tablet; Synthroid 25 mcg tablet  -     Cancel: venlafaxine (EFFEXOR XR) 75 mg 24 hr capsule;  Take 1 capsule (75 mg total) by mouth daily Subjective:      Patient ID: Karen Meeks is a 69 y o  female  66-year-old female presents for exacerbation of depression and anxiety  Reports that she ran out of her Effexor and Klonopin several weeks ago, reporting that the mail delivery system never brought her her refill  For last week she has noted a significant increase in her anxiety  She feels very on edge and is very upset with her temper  Her  calls her out on her mood which makes her feel worse  Yesterday I had called in refills to a local pharmacy which she picked up without an issue  She denies any suicidal ideation but is having fatigue and difficulty sleeping   and her are getting along well currently  Her daughter is doing well at rehab in [place] where she is undergoing PT  Patient reports overall her mood was controlled when she was taking her medications regularly        The following portions of the patient's history were reviewed and updated as appropriate: allergies, current medications, past family history, past medical history, past social history, past surgical history and problem list     Review of Systems   Constitutional: Positive for fatigue  Negative for chills and fever  Respiratory: Negative for cough and shortness of breath  Cardiovascular: Negative for chest pain and leg swelling  Psychiatric/Behavioral: Positive for agitation, decreased concentration, dysphoric mood and sleep disturbance  Negative for suicidal ideas  The patient is nervous/anxious  Objective:      /90 (BP Location: Left arm, Patient Position: Sitting, Cuff Size: Standard)   Pulse 77   Temp 100 1 °F (37 8 °C)   Ht 5' 6" (1 676 m)   Wt 111 kg (245 lb)   SpO2 (!) 83%   BMI 39 54 kg/m²          Physical Exam   Constitutional: She appears well-developed and well-nourished  Cardiovascular: Normal rate, regular rhythm and normal heart sounds      Pulmonary/Chest: Effort normal and breath sounds normal  No respiratory distress  She has no wheezes  She has no rales  Skin: She is not diaphoretic  Psychiatric: Her mood appears anxious

## 2018-05-22 NOTE — ASSESSMENT & PLAN NOTE
Flare up secondary to running out of her medications several weeks ago  I suggested that in the future if she ever notices a problem with running out of medicine she should let us know as soon as possible to prevent it from getting to this point    As she has been moderately stable when taking her medicine regularly, I believe no adjustments are necessary at this time but patient just needs to be consistent with her medications

## 2018-05-29 DIAGNOSIS — K58.9 IRRITABLE BOWEL SYNDROME, UNSPECIFIED TYPE: Primary | ICD-10-CM

## 2018-06-11 DIAGNOSIS — E03.9 ACQUIRED HYPOTHYROIDISM: Primary | ICD-10-CM

## 2018-06-12 RX ORDER — LEVOTHYROXINE SODIUM 25 MCG
25 TABLET ORAL DAILY
Qty: 90 TABLET | Refills: 1 | Status: SHIPPED | OUTPATIENT
Start: 2018-06-12 | End: 2018-12-09 | Stop reason: SDUPTHER

## 2018-08-01 ENCOUNTER — OFFICE VISIT (OUTPATIENT)
Dept: FAMILY MEDICINE CLINIC | Facility: CLINIC | Age: 68
End: 2018-08-01
Payer: COMMERCIAL

## 2018-08-01 VITALS
OXYGEN SATURATION: 96 % | SYSTOLIC BLOOD PRESSURE: 138 MMHG | WEIGHT: 238 LBS | HEIGHT: 66 IN | HEART RATE: 82 BPM | BODY MASS INDEX: 38.25 KG/M2 | TEMPERATURE: 97.8 F | DIASTOLIC BLOOD PRESSURE: 78 MMHG

## 2018-08-01 DIAGNOSIS — I10 HYPERTENSION, BENIGN: ICD-10-CM

## 2018-08-01 DIAGNOSIS — R05.3 CHRONIC COUGH: Primary | ICD-10-CM

## 2018-08-01 DIAGNOSIS — E03.9 HYPOTHYROIDISM, UNSPECIFIED TYPE: ICD-10-CM

## 2018-08-01 DIAGNOSIS — F33.2 SEVERE EPISODE OF RECURRENT MAJOR DEPRESSIVE DISORDER, WITHOUT PSYCHOTIC FEATURES (HCC): ICD-10-CM

## 2018-08-01 PROBLEM — E78.2 MIXED HYPERLIPIDEMIA: Status: RESOLVED | Noted: 2017-01-30 | Resolved: 2018-08-01

## 2018-08-01 PROCEDURE — 99214 OFFICE O/P EST MOD 30 MIN: CPT | Performed by: PHYSICIAN ASSISTANT

## 2018-08-01 PROCEDURE — 1101F PT FALLS ASSESS-DOCD LE1/YR: CPT | Performed by: PHYSICIAN ASSISTANT

## 2018-08-01 RX ORDER — BACLOFEN 10 MG/1
TABLET ORAL
COMMUNITY
Start: 2018-05-30

## 2018-08-01 NOTE — ASSESSMENT & PLAN NOTE
Pt to d/c lisinopril and return in 1 month for reevaluation  She should continue prevacid, allegra, and flonase daily  If still symptomatic, will order CXR and Rx singulair  Most likely allergies given nasal congestion and cough worse when laying down which correlates with PND   Will also consider PFTs if no relief

## 2018-08-01 NOTE — ASSESSMENT & PLAN NOTE
Borderline control at 138/78  Pt is to return in 1 month for reevaluation of chronic cough and will recheck BP at that time, especially since we will be d/c lisinopril   Consider adding diuretic

## 2018-08-01 NOTE — PROGRESS NOTES
Assessment/Plan:    Hypothyroidism  TSH in March stable  Continue synthroid 25 mcg    Hypertension, benign  Borderline control at 138/78  Pt is to return in 1 month for reevaluation of chronic cough and will recheck BP at that time, especially since we will be d/c lisinopril  Consider adding diuretic    Chronic cough  Pt to d/c lisinopril and return in 1 month for reevaluation  She should continue prevacid, allegra, and flonase daily  If still symptomatic, will order CXR and Rx singulair  Most likely allergies given nasal congestion and cough worse when laying down which correlates with PND  Will also consider PFTs if no relief    Depression  Patient is currently coping very well  Continue current management and return if any changes       Diagnoses and all orders for this visit:    Chronic cough    Severe episode of recurrent major depressive disorder, without psychotic features ([facility] )    Hypertension, benign    Hypothyroidism, unspecified type    Other orders  -     baclofen 10 mg tablet;           Subjective:      Patient ID: Karen Meeks is a 69 y o  female  70 y/o F hx depression, htn, hypothyroidism presents c/o chronic cough for several months  Is a dry, irritating cough worse when laying flat  She has associated nasal congestion but denies fever, chills, nightsweats, weight loss, SOB, CP, pain with breathing,ear pain, sinus pressure, sore throat  GERD has been well controlled with prevacid 30 mg daily  Takes allegra and flonase daily for allergies  Reports good control of asthma  Was started on lisinopril several months ago  No recent travel or known contact with TB  No hx malignancy  Pt doing well with depression on venlafaxine 150 mg  Her daughter who was struggling with liver failure and substance abuse passed yesterday but she has been coping well, stating she believes her daughter is in a better place  Sleeping and eating well, denies SI    Hypothyroidism has been stable on synthroid 25 mcg, TSH in May was normal  Blood pressure today 138/78, currently only on lisinopril 5 mg  No CP, HA, vision changes          The following portions of the patient's history were reviewed and updated as appropriate: allergies, current medications, past family history, past medical history, past social history, past surgical history and problem list     Review of Systems   Constitutional: Negative for diaphoresis, fatigue and fever  HENT: Negative for congestion, ear pain, hearing loss, postnasal drip, rhinorrhea and sore throat  Eyes: Negative for pain, redness and visual disturbance  Respiratory: Positive for cough  Negative for shortness of breath and wheezing  Cardiovascular: Negative for chest pain, palpitations and leg swelling  Gastrointestinal: Negative for anal bleeding, constipation, diarrhea, nausea and vomiting  Endocrine: Negative for polydipsia and polyuria  Genitourinary: Negative for dysuria, flank pain and hematuria  Musculoskeletal: Negative for arthralgias, back pain, joint swelling and myalgias  Skin: Negative for pallor, rash and wound  Neurological: Negative for dizziness, syncope, numbness and headaches  Hematological: Negative for adenopathy  Does not bruise/bleed easily  Psychiatric/Behavioral: Positive for dysphoric mood  Negative for decreased concentration, sleep disturbance and suicidal ideas  The patient is not nervous/anxious  Objective:      /78   Pulse 82   Temp 97 8 °F (36 6 °C)   Ht 5' 6" (1 676 m)   Wt 108 kg (238 lb)   SpO2 96%   BMI 38 41 kg/m²          Physical Exam   Constitutional: She is oriented to person, place, and time  She appears well-developed and well-nourished  No distress  HENT:   Head: Normocephalic and atraumatic  Mouth/Throat: Oropharynx is clear and moist    Post nasal drip   Eyes: Conjunctivae and EOM are normal  Pupils are equal, round, and reactive to light  Right eye exhibits no discharge   Left eye exhibits no discharge  No scleral icterus  Neck: Normal range of motion  Neck supple  No thyromegaly present  Cardiovascular: Normal rate, regular rhythm and normal heart sounds  Exam reveals no gallop and no friction rub  No murmur heard  Pulmonary/Chest: Effort normal and breath sounds normal  No respiratory distress  She has no wheezes  She has no rales  Musculoskeletal: Normal range of motion  She exhibits no edema  Lymphadenopathy:     She has no cervical adenopathy  Neurological: She is alert and oriented to person, place, and time  No cranial nerve deficit  Skin: Skin is warm and dry  No rash noted  She is not diaphoretic  No erythema  No pallor

## 2018-08-02 ENCOUNTER — TELEPHONE (OUTPATIENT)
Dept: FAMILY MEDICINE CLINIC | Facility: CLINIC | Age: 68
End: 2018-08-02

## 2018-08-15 ENCOUNTER — TRANSCRIBE ORDERS (OUTPATIENT)
Dept: ADMINISTRATIVE | Facility: HOSPITAL | Age: 68
End: 2018-08-15

## 2018-08-15 DIAGNOSIS — Z12.39 SCREENING BREAST EXAMINATION: Primary | ICD-10-CM

## 2018-08-16 ENCOUNTER — DOCUMENTATION (OUTPATIENT)
Dept: FAMILY MEDICINE CLINIC | Facility: CLINIC | Age: 68
End: 2018-08-16

## 2018-08-16 LAB — HEPATITIS C ANTIBODY (HISTORICAL): NEGATIVE

## 2018-08-21 ENCOUNTER — HOSPITAL ENCOUNTER (EMERGENCY)
Facility: HOSPITAL | Age: 68
Discharge: HOME | End: 2018-08-21
Attending: EMERGENCY MEDICINE | Admitting: EMERGENCY MEDICINE
Payer: COMMERCIAL

## 2018-08-21 ENCOUNTER — OFFICE VISIT (OUTPATIENT)
Dept: FAMILY MEDICINE CLINIC | Facility: CLINIC | Age: 68
End: 2018-08-21
Payer: COMMERCIAL

## 2018-08-21 ENCOUNTER — HOSPITAL ENCOUNTER (OUTPATIENT)
Dept: CT IMAGING | Facility: HOSPITAL | Age: 68
Discharge: HOME | End: 2018-08-21
Payer: COMMERCIAL

## 2018-08-21 VITALS
OXYGEN SATURATION: 98 % | SYSTOLIC BLOOD PRESSURE: 136 MMHG | BODY MASS INDEX: 37.72 KG/M2 | TEMPERATURE: 97.9 F | RESPIRATION RATE: 18 BRPM | HEART RATE: 64 BPM | DIASTOLIC BLOOD PRESSURE: 60 MMHG | WEIGHT: 233.69 LBS

## 2018-08-21 VITALS
DIASTOLIC BLOOD PRESSURE: 80 MMHG | SYSTOLIC BLOOD PRESSURE: 140 MMHG | WEIGHT: 234 LBS | TEMPERATURE: 98.5 F | OXYGEN SATURATION: 98 % | BODY MASS INDEX: 37.61 KG/M2 | HEART RATE: 70 BPM | HEIGHT: 66 IN

## 2018-08-21 DIAGNOSIS — N39.0 UTI (URINARY TRACT INFECTION): Primary | ICD-10-CM

## 2018-08-21 DIAGNOSIS — R10.9 ACUTE RIGHT FLANK PAIN: ICD-10-CM

## 2018-08-21 DIAGNOSIS — N39.0 URINARY TRACT INFECTION, ACUTE: Primary | ICD-10-CM

## 2018-08-21 DIAGNOSIS — N12 PYELONEPHRITIS: ICD-10-CM

## 2018-08-21 DIAGNOSIS — R10.9 RIGHT FLANK PAIN: ICD-10-CM

## 2018-08-21 DIAGNOSIS — N39.0 URINARY TRACT INFECTION, CHRONIC: ICD-10-CM

## 2018-08-21 LAB
ALBUMIN SERPL BCP-MCNC: 3.7 G/DL (ref 3.5–5)
ALP SERPL-CCNC: 114 U/L (ref 46–116)
ALT SERPL W P-5'-P-CCNC: 25 U/L (ref 12–78)
ANION GAP SERPL CALCULATED.3IONS-SCNC: 6 MMOL/L (ref 4–13)
APTT PPP: 28 SECONDS (ref 24–36)
AST SERPL W P-5'-P-CCNC: 20 U/L (ref 5–45)
BACTERIA UR QL AUTO: ABNORMAL /HPF
BASOPHILS # BLD AUTO: 0.03 THOUSANDS/ΜL (ref 0–0.1)
BASOPHILS NFR BLD AUTO: 0 % (ref 0–1)
BILIRUB SERPL-MCNC: 0.36 MG/DL (ref 0.2–1)
BILIRUB UR QL STRIP: NEGATIVE
BUN SERPL-MCNC: 13 MG/DL (ref 5–25)
CALCIUM SERPL-MCNC: 8.9 MG/DL (ref 8.3–10.1)
CHLORIDE SERPL-SCNC: 104 MMOL/L (ref 100–108)
CLARITY UR: ABNORMAL
CO2 SERPL-SCNC: 29 MMOL/L (ref 21–32)
COLOR UR: YELLOW
CREAT SERPL-MCNC: 0.74 MG/DL (ref 0.6–1.3)
EOSINOPHIL # BLD AUTO: 0.35 THOUSAND/ΜL (ref 0–0.61)
EOSINOPHIL NFR BLD AUTO: 4 % (ref 0–6)
ERYTHROCYTE [DISTWIDTH] IN BLOOD BY AUTOMATED COUNT: 14.4 % (ref 11.6–15.1)
GFR SERPL CREATININE-BSD FRML MDRD: 83 ML/MIN/1.73SQ M
GLUCOSE SERPL-MCNC: 120 MG/DL (ref 65–140)
GLUCOSE UR STRIP-MCNC: NEGATIVE MG/DL
HCT VFR BLD AUTO: 38.3 % (ref 34.8–46.1)
HGB BLD-MCNC: 12.8 G/DL (ref 11.5–15.4)
HGB UR QL STRIP.AUTO: ABNORMAL
INR PPP: 0.95 (ref 0.86–1.17)
KETONES UR STRIP-MCNC: NEGATIVE MG/DL
LEUKOCYTE ESTERASE UR QL STRIP: ABNORMAL
LYMPHOCYTES # BLD AUTO: 3.54 THOUSANDS/ΜL (ref 0.6–4.47)
LYMPHOCYTES NFR BLD AUTO: 38 % (ref 14–44)
MCH RBC QN AUTO: 31.1 PG (ref 26.8–34.3)
MCHC RBC AUTO-ENTMCNC: 33.4 G/DL (ref 31.4–37.4)
MCV RBC AUTO: 93 FL (ref 82–98)
MONOCYTES # BLD AUTO: 0.65 THOUSAND/ΜL (ref 0.17–1.22)
MONOCYTES NFR BLD AUTO: 7 % (ref 4–12)
NEUTROPHILS # BLD AUTO: 4.88 THOUSANDS/ΜL (ref 1.85–7.62)
NEUTS SEG NFR BLD AUTO: 52 % (ref 43–75)
NITRITE UR QL STRIP: POSITIVE
NON-SQ EPI CELLS URNS QL MICRO: ABNORMAL /HPF
NRBC BLD AUTO-RTO: 0 /100 WBCS
PH UR STRIP.AUTO: 5.5 [PH] (ref 4.5–8)
PLATELET # BLD AUTO: 250 THOUSANDS/UL (ref 149–390)
PMV BLD AUTO: 10.5 FL (ref 8.9–12.7)
POTASSIUM SERPL-SCNC: 3.4 MMOL/L (ref 3.5–5.3)
PROT SERPL-MCNC: 7.4 G/DL (ref 6.4–8.2)
PROT UR STRIP-MCNC: NEGATIVE MG/DL
PROTHROMBIN TIME: 12.8 SECONDS (ref 11.8–14.2)
RBC # BLD AUTO: 4.12 MILLION/UL (ref 3.81–5.12)
RBC #/AREA URNS AUTO: ABNORMAL /HPF
SL AMB  POCT GLUCOSE, UA: NEGATIVE
SL AMB LEUKOCYTE ESTERASE,UA: ABNORMAL
SL AMB POCT BILIRUBIN,UA: NEGATIVE
SL AMB POCT BLOOD,UA: 6.5
SL AMB POCT CLARITY,UA: ABNORMAL
SL AMB POCT COLOR,UA: YELLOW
SL AMB POCT KETONES,UA: NEGATIVE
SL AMB POCT NITRITE,UA: NEGATIVE
SL AMB POCT PH,UA: 5
SL AMB POCT SPECIFIC GRAVITY,UA: 1.01
SL AMB POCT URINE PROTEIN: NEGATIVE
SL AMB POCT UROBILINOGEN: NEGATIVE
SODIUM SERPL-SCNC: 139 MMOL/L (ref 136–145)
SP GR UR STRIP.AUTO: >=1.03 (ref 1–1.03)
UROBILINOGEN UR QL STRIP.AUTO: 0.2 E.U./DL
WBC # BLD AUTO: 9.45 THOUSAND/UL (ref 4.31–10.16)
WBC #/AREA URNS AUTO: ABNORMAL /HPF

## 2018-08-21 PROCEDURE — 85025 COMPLETE CBC W/AUTO DIFF WBC: CPT | Performed by: EMERGENCY MEDICINE

## 2018-08-21 PROCEDURE — 99284 EMERGENCY DEPT VISIT MOD MDM: CPT

## 2018-08-21 PROCEDURE — 85730 THROMBOPLASTIN TIME PARTIAL: CPT | Performed by: EMERGENCY MEDICINE

## 2018-08-21 PROCEDURE — 81003 URINALYSIS AUTO W/O SCOPE: CPT | Performed by: PHYSICIAN ASSISTANT

## 2018-08-21 PROCEDURE — 81001 URINALYSIS AUTO W/SCOPE: CPT | Performed by: EMERGENCY MEDICINE

## 2018-08-21 PROCEDURE — 87077 CULTURE AEROBIC IDENTIFY: CPT | Performed by: EMERGENCY MEDICINE

## 2018-08-21 PROCEDURE — 36415 COLL VENOUS BLD VENIPUNCTURE: CPT | Performed by: EMERGENCY MEDICINE

## 2018-08-21 PROCEDURE — 87086 URINE CULTURE/COLONY COUNT: CPT | Performed by: PHYSICIAN ASSISTANT

## 2018-08-21 PROCEDURE — 96375 TX/PRO/DX INJ NEW DRUG ADDON: CPT

## 2018-08-21 PROCEDURE — 87086 URINE CULTURE/COLONY COUNT: CPT | Performed by: EMERGENCY MEDICINE

## 2018-08-21 PROCEDURE — 87186 SC STD MICRODIL/AGAR DIL: CPT | Performed by: PHYSICIAN ASSISTANT

## 2018-08-21 PROCEDURE — 96365 THER/PROPH/DIAG IV INF INIT: CPT

## 2018-08-21 PROCEDURE — 80053 COMPREHEN METABOLIC PANEL: CPT | Performed by: EMERGENCY MEDICINE

## 2018-08-21 PROCEDURE — 74176 CT ABD & PELVIS W/O CONTRAST: CPT

## 2018-08-21 PROCEDURE — 87077 CULTURE AEROBIC IDENTIFY: CPT | Performed by: PHYSICIAN ASSISTANT

## 2018-08-21 PROCEDURE — 87186 SC STD MICRODIL/AGAR DIL: CPT | Performed by: EMERGENCY MEDICINE

## 2018-08-21 PROCEDURE — 87181 SC STD AGAR DILUTION PER AGT: CPT | Performed by: EMERGENCY MEDICINE

## 2018-08-21 PROCEDURE — 99214 OFFICE O/P EST MOD 30 MIN: CPT | Performed by: PHYSICIAN ASSISTANT

## 2018-08-21 PROCEDURE — 3008F BODY MASS INDEX DOCD: CPT | Performed by: PHYSICIAN ASSISTANT

## 2018-08-21 PROCEDURE — 85610 PROTHROMBIN TIME: CPT | Performed by: EMERGENCY MEDICINE

## 2018-08-21 RX ORDER — ALBUTEROL SULFATE 90 UG/1
AEROSOL, METERED RESPIRATORY (INHALATION)
COMMUNITY

## 2018-08-21 RX ORDER — FENTANYL CITRATE 50 UG/ML
50 INJECTION, SOLUTION INTRAMUSCULAR; INTRAVENOUS ONCE
Status: COMPLETED | OUTPATIENT
Start: 2018-08-21 | End: 2018-08-21

## 2018-08-21 RX ORDER — CIPROFLOXACIN 500 MG/1
500 TABLET, FILM COATED ORAL
COMMUNITY
Start: 2018-08-19 | End: 2018-08-26 | Stop reason: HOSPADM

## 2018-08-21 RX ORDER — KETOROLAC TROMETHAMINE 30 MG/ML
15 INJECTION, SOLUTION INTRAMUSCULAR; INTRAVENOUS ONCE
Status: COMPLETED | OUTPATIENT
Start: 2018-08-21 | End: 2018-08-21

## 2018-08-21 RX ORDER — TRAMADOL HYDROCHLORIDE 50 MG/1
50 TABLET ORAL EVERY 6 HOURS PRN
Qty: 20 TABLET | Refills: 0 | Status: SHIPPED | OUTPATIENT
Start: 2018-08-21 | End: 2018-08-26

## 2018-08-21 RX ORDER — LEVOFLOXACIN 750 MG/1
750 TABLET ORAL DAILY
Qty: 5 TABLET | Refills: 0 | Status: SHIPPED | OUTPATIENT
Start: 2018-08-21 | End: 2018-08-26 | Stop reason: HOSPADM

## 2018-08-21 RX ORDER — PEG-3350, SODIUM SULFATE, SODIUM CHLORIDE, POTASSIUM CHLORIDE, SODIUM ASCORBATE AND ASCORBIC ACID 7.5-2.691G
KIT ORAL
COMMUNITY
End: 2018-09-05 | Stop reason: ALTCHOICE

## 2018-08-21 RX ORDER — ERYTHROMYCIN 5 MG/G
OINTMENT OPHTHALMIC
COMMUNITY
End: 2018-08-26 | Stop reason: HOSPADM

## 2018-08-21 RX ORDER — VENLAFAXINE HYDROCHLORIDE 75 MG/1
CAPSULE, EXTENDED RELEASE ORAL
COMMUNITY
End: 2018-12-19 | Stop reason: DRUGHIGH

## 2018-08-21 RX ORDER — ERYTHROMYCIN 5 MG/G
OINTMENT OPHTHALMIC
COMMUNITY
End: 2018-08-21 | Stop reason: SDUPTHER

## 2018-08-21 RX ADMIN — FENTANYL CITRATE 50 MCG: 50 INJECTION, SOLUTION INTRAMUSCULAR; INTRAVENOUS at 22:08

## 2018-08-21 RX ADMIN — CEFTRIAXONE 1000 MG: 1 INJECTION, POWDER, FOR SOLUTION INTRAMUSCULAR; INTRAVENOUS at 22:35

## 2018-08-21 RX ADMIN — KETOROLAC TROMETHAMINE 15 MG: 30 INJECTION, SOLUTION INTRAMUSCULAR at 22:07

## 2018-08-21 NOTE — PROGRESS NOTES
Assessment/Plan:    Acute right flank pain  Concern for complicated UTI verses renal calculus  Stat CT ordered  If positive for stone, will call in Toradol as well as Flomax to help with stone passage  If positive for pyelonephritis, patient understands that she will be instructed to go to the ER due to failure to respond to Cipro       Diagnoses and all orders for this visit:    Urinary tract infection, acute    Acute right flank pain  -     CT abdomen pelvis wo contrast; Future    Urinary tract infection, chronic  -     Urine culture  -     POCT urine dip auto non-scope    Other orders  -     albuterol (PROAIR HFA) 90 mcg/act inhaler; Inhale 1 puff once daily  Subjective:      Patient ID: Stevie Carrasco is a 69 y o  female  56-year-old female presents complaining of right flank pain for 5 days  Reports symptoms have been worsening  Feels like a constant ache but is sometimes sharp and stabbing  She was seen at [facility] on Sunday and was diagnosed with UTI with positive leukocytes and blood on dipstick  Urine culture results not yet available  She was placed on Cipro 500 mg b i d  for 5 days  She has not noticed any improvement in symptoms  She denies any dysuria, urinary urgency or frequency, hematuria, fever, chills, nausea or vomiting  She does have a history of 2 kidney stones in the past   Urine dip today again positive for leukocytes and blood        The following portions of the patient's history were reviewed and updated as appropriate: allergies, current medications, past family history, past medical history, past social history, past surgical history and problem list     Review of Systems   Constitutional: Negative for chills and fever  Gastrointestinal: Positive for abdominal pain  Negative for nausea and vomiting  Genitourinary: Positive for flank pain  Negative for difficulty urinating, dysuria, frequency, hematuria, pelvic pain and urgency     Neurological: Negative for light-headedness  Objective:      /80   Pulse 70   Temp 98 5 °F (36 9 °C)   Ht 5' 6" (1 676 m)   Wt 106 kg (234 lb)   SpO2 98%   BMI 37 77 kg/m²          Physical Exam   Constitutional: She is oriented to person, place, and time  She appears well-developed and well-nourished  No distress  HENT:   Head: Normocephalic and atraumatic  Mouth/Throat: Oropharynx is clear and moist    Eyes: Conjunctivae and EOM are normal  Pupils are equal, round, and reactive to light  Right eye exhibits no discharge  Left eye exhibits no discharge  No scleral icterus  Neck: Normal range of motion  Neck supple  No thyromegaly present  Cardiovascular: Normal rate, regular rhythm and normal heart sounds  Exam reveals no gallop and no friction rub  No murmur heard  Pulmonary/Chest: Effort normal and breath sounds normal  No respiratory distress  She has no wheezes  She has no rales  Abdominal: Soft  She exhibits no distension and no mass  There is no tenderness  There is CVA tenderness  There is no rebound and no guarding  Musculoskeletal: Normal range of motion  She exhibits no edema  Lymphadenopathy:     She has no cervical adenopathy  Neurological: She is alert and oriented to person, place, and time  No cranial nerve deficit  Skin: Skin is warm and dry  No rash noted  She is not diaphoretic  No erythema  No pallor

## 2018-08-21 NOTE — ASSESSMENT & PLAN NOTE
Concern for complicated UTI verses renal calculus  Stat CT ordered  If positive for stone, will call in Toradol as well as Flomax to help with stone passage    If positive for pyelonephritis, patient understands that she will be instructed to go to the ER due to failure to respond to Cipro

## 2018-08-22 ENCOUNTER — HOSPITAL ENCOUNTER (INPATIENT)
Facility: HOSPITAL | Age: 68
LOS: 4 days | Discharge: HOME | DRG: 690 | End: 2018-08-26
Attending: EMERGENCY MEDICINE | Admitting: INTERNAL MEDICINE
Payer: COMMERCIAL

## 2018-08-22 DIAGNOSIS — N12 PYELONEPHRITIS: Primary | ICD-10-CM

## 2018-08-22 LAB
ANION GAP SERPL CALCULATED.3IONS-SCNC: 10 MMOL/L (ref 4–13)
BASOPHILS # BLD AUTO: 0.03 THOUSANDS/ΜL (ref 0–0.1)
BASOPHILS NFR BLD AUTO: 0 % (ref 0–1)
BUN SERPL-MCNC: 11 MG/DL (ref 5–25)
CALCIUM SERPL-MCNC: 9.1 MG/DL (ref 8.3–10.1)
CHLORIDE SERPL-SCNC: 101 MMOL/L (ref 100–108)
CO2 SERPL-SCNC: 28 MMOL/L (ref 21–32)
CREAT SERPL-MCNC: 0.66 MG/DL (ref 0.6–1.3)
EOSINOPHIL # BLD AUTO: 0.32 THOUSAND/ΜL (ref 0–0.61)
EOSINOPHIL NFR BLD AUTO: 4 % (ref 0–6)
ERYTHROCYTE [DISTWIDTH] IN BLOOD BY AUTOMATED COUNT: 14.3 % (ref 11.6–15.1)
GFR SERPL CREATININE-BSD FRML MDRD: 90 ML/MIN/1.73SQ M
GLUCOSE SERPL-MCNC: 85 MG/DL (ref 65–140)
HCT VFR BLD AUTO: 39.6 % (ref 34.8–46.1)
HGB BLD-MCNC: 13 G/DL (ref 11.5–15.4)
LYMPHOCYTES # BLD AUTO: 2.9 THOUSANDS/ΜL (ref 0.6–4.47)
LYMPHOCYTES NFR BLD AUTO: 37 % (ref 14–44)
MCH RBC QN AUTO: 30.6 PG (ref 26.8–34.3)
MCHC RBC AUTO-ENTMCNC: 32.8 G/DL (ref 31.4–37.4)
MCV RBC AUTO: 93 FL (ref 82–98)
MONOCYTES # BLD AUTO: 0.78 THOUSAND/ΜL (ref 0.17–1.22)
MONOCYTES NFR BLD AUTO: 10 % (ref 4–12)
NEUTROPHILS # BLD AUTO: 3.86 THOUSANDS/ΜL (ref 1.85–7.62)
NEUTS SEG NFR BLD AUTO: 49 % (ref 43–75)
NRBC BLD AUTO-RTO: 0 /100 WBCS
PLATELET # BLD AUTO: 265 THOUSANDS/UL (ref 149–390)
PMV BLD AUTO: 10.6 FL (ref 8.9–12.7)
POTASSIUM SERPL-SCNC: 3.9 MMOL/L (ref 3.5–5.3)
RBC # BLD AUTO: 4.25 MILLION/UL (ref 3.81–5.12)
SODIUM SERPL-SCNC: 139 MMOL/L (ref 136–145)
WBC # BLD AUTO: 7.89 THOUSAND/UL (ref 4.31–10.16)

## 2018-08-22 PROCEDURE — 36415 COLL VENOUS BLD VENIPUNCTURE: CPT | Performed by: EMERGENCY MEDICINE

## 2018-08-22 PROCEDURE — 80048 BASIC METABOLIC PNL TOTAL CA: CPT | Performed by: EMERGENCY MEDICINE

## 2018-08-22 PROCEDURE — 99285 EMERGENCY DEPT VISIT HI MDM: CPT

## 2018-08-22 PROCEDURE — 85025 COMPLETE CBC W/AUTO DIFF WBC: CPT | Performed by: EMERGENCY MEDICINE

## 2018-08-22 PROCEDURE — 99223 1ST HOSP IP/OBS HIGH 75: CPT | Performed by: INTERNAL MEDICINE

## 2018-08-22 RX ORDER — PANTOPRAZOLE SODIUM 40 MG/1
40 TABLET, DELAYED RELEASE ORAL
Status: DISCONTINUED | OUTPATIENT
Start: 2018-08-23 | End: 2018-08-26 | Stop reason: HOSPADM

## 2018-08-22 RX ORDER — TRAMADOL HYDROCHLORIDE 50 MG/1
50 TABLET ORAL EVERY 6 HOURS PRN
Status: DISCONTINUED | OUTPATIENT
Start: 2018-08-22 | End: 2018-08-26

## 2018-08-22 RX ORDER — VENLAFAXINE HYDROCHLORIDE 75 MG/1
75 CAPSULE, EXTENDED RELEASE ORAL DAILY
Status: DISCONTINUED | OUTPATIENT
Start: 2018-08-23 | End: 2018-08-26 | Stop reason: HOSPADM

## 2018-08-22 RX ORDER — LORATADINE 10 MG/1
10 TABLET ORAL DAILY
Status: DISCONTINUED | OUTPATIENT
Start: 2018-08-23 | End: 2018-08-26 | Stop reason: HOSPADM

## 2018-08-22 RX ORDER — LEVOTHYROXINE SODIUM 0.03 MG/1
25 TABLET ORAL
Status: DISCONTINUED | OUTPATIENT
Start: 2018-08-23 | End: 2018-08-26 | Stop reason: HOSPADM

## 2018-08-22 RX ORDER — CLONAZEPAM 0.5 MG/1
0.5 TABLET ORAL 2 TIMES DAILY PRN
Status: DISCONTINUED | OUTPATIENT
Start: 2018-08-22 | End: 2018-08-26 | Stop reason: HOSPADM

## 2018-08-22 RX ORDER — TIZANIDINE 2 MG/1
2 TABLET ORAL DAILY
Status: DISCONTINUED | OUTPATIENT
Start: 2018-08-23 | End: 2018-08-26 | Stop reason: HOSPADM

## 2018-08-22 RX ORDER — ACETAMINOPHEN 325 MG/1
650 TABLET ORAL EVERY 6 HOURS PRN
Status: DISCONTINUED | OUTPATIENT
Start: 2018-08-22 | End: 2018-08-26 | Stop reason: HOSPADM

## 2018-08-22 RX ORDER — BUDESONIDE AND FORMOTEROL FUMARATE DIHYDRATE 80; 4.5 UG/1; UG/1
1 AEROSOL RESPIRATORY (INHALATION) DAILY
Status: DISCONTINUED | OUTPATIENT
Start: 2018-08-23 | End: 2018-08-26 | Stop reason: HOSPADM

## 2018-08-22 RX ORDER — SODIUM CHLORIDE AND POTASSIUM CHLORIDE .9; .15 G/100ML; G/100ML
75 SOLUTION INTRAVENOUS CONTINUOUS
Status: DISCONTINUED | OUTPATIENT
Start: 2018-08-22 | End: 2018-08-23

## 2018-08-22 RX ORDER — GABAPENTIN 300 MG/1
300 CAPSULE ORAL 3 TIMES DAILY
Status: DISCONTINUED | OUTPATIENT
Start: 2018-08-22 | End: 2018-08-26 | Stop reason: HOSPADM

## 2018-08-22 RX ORDER — SODIUM CHLORIDE 9 MG/ML
125 INJECTION, SOLUTION INTRAVENOUS CONTINUOUS
Status: DISCONTINUED | OUTPATIENT
Start: 2018-08-22 | End: 2018-08-22

## 2018-08-22 RX ORDER — FLUTICASONE PROPIONATE 50 MCG
1 SPRAY, SUSPENSION (ML) NASAL DAILY
Status: DISCONTINUED | OUTPATIENT
Start: 2018-08-23 | End: 2018-08-26 | Stop reason: HOSPADM

## 2018-08-22 RX ADMIN — SODIUM CHLORIDE 125 ML/HR: 0.9 INJECTION, SOLUTION INTRAVENOUS at 17:14

## 2018-08-22 RX ADMIN — SODIUM CHLORIDE 1000 MG: 9 INJECTION, SOLUTION INTRAVENOUS at 17:37

## 2018-08-22 RX ADMIN — ACETAMINOPHEN 650 MG: 325 TABLET, FILM COATED ORAL at 20:57

## 2018-08-22 RX ADMIN — GABAPENTIN 300 MG: 300 CAPSULE ORAL at 20:56

## 2018-08-22 RX ADMIN — SODIUM CHLORIDE AND POTASSIUM CHLORIDE 75 ML/HR: .9; .15 SOLUTION INTRAVENOUS at 19:24

## 2018-08-22 RX ADMIN — DEXTRAN 70 AND HYPROMELLOSE 2910 1 DROP: 1; 3 SOLUTION/ DROPS OPHTHALMIC at 20:56

## 2018-08-22 NOTE — H&P
H&P Exam - Taz Calzada 69 y o  female MRN: [id number]    Unit/Bed#: ED 20 Encounter: [id number]        History of Present Illness   HPI:  Taz Calzada is a 69 y o  female who presents with flank pain  Patient reports having a urinary tract infection, treated with multiple antibiotics, recent urinary chronic arterial growing E coli reported drug resistant  Only sensitivity is to ertapenem  Back flank pain progressing over 1 to 2 days, denies specific urine symptoms such as frequency pain however  She denies fevers or chills  Review of Systems   Constitutional: Negative  HENT: Negative  Eyes: Negative  Respiratory: Negative  Cardiovascular: Negative  Gastrointestinal: Negative  Endocrine: Negative  Genitourinary: Positive for flank pain  Negative for pelvic pain  Musculoskeletal: Positive for back pain  Skin: Negative  Allergic/Immunologic: Negative  Neurological: Negative  Hematological: Negative  Psychiatric/Behavioral: Negative          Historical Information   Past Medical History:   Diagnosis Date    Anxiety     Arthritis     Carpal tunnel syndrome     Cytomegalovirus infection ([facility] )     Depression     Disease of thyroid gland     Diverticulosis     Esophageal reflux     GERD (gastroesophageal reflux disease)     Glaucoma     Herpes zoster     Interstitial cystitis     Kidney stones, calcium oxalate     Lumbar disc disorder     Malignant neoplasm of skin     Obesity     Sleep apnea      Past Surgical History:   Procedure Laterality Date    BLADDER EXTROPHY RECONSTRUCTION PELVIC SAGITTAL OSTEOTOMY      BREAST LUMPECTOMY      NEUROPLASTY / TRANSPOSITION MEDIAN NERVE AT CARPAL TUNNEL      REPLACEMENT TOTAL KNEE Bilateral     SINUS SURGERY      TOTAL ABDOMINAL HYSTERECTOMY       Social History   History   Alcohol Use No     History   Drug use: Unknown     History   Smoking Status    Never Smoker   Smokeless Tobacco    Never Used     Family History Problem Relation Age of Onset    Coronary artery disease Mother     Other Mother         domestic violence of adult    Hyperlipidemia Mother     Coronary artery disease Father     Coronary artery disease Maternal Grandmother     Diabetes Maternal Grandmother        Meds/Allergies     (Not in a hospital admission)  Allergies   Allergen Reactions    Sulfa Antibiotics Hives     swelling  swelling    Amoxicillin Hives    Augmentin Es-600  [Amoxicillin-Pot Clavulanate]     Bee Venom     Doxycycline Other (See Comments)       Objective   Vitals: Blood pressure (!) 175/81, pulse 76, temperature 98 6 °F (37 °C), temperature source Temporal, resp  rate 16, weight 106 kg (233 lb 11 oz), SpO2 98 %  No intake or output data in the 24 hours ending 08/22/18 1747    Invasive Devices     Peripheral Intravenous Line            Peripheral IV 08/22/18 Right Antecubital less than 1 day                Physical Exam   Constitutional: She is oriented to person, place, and time  She appears well-developed and well-nourished  HENT:   Head: Normocephalic and atraumatic  Eyes: Conjunctivae are normal  Pupils are equal, round, and reactive to light  Neck: Normal range of motion  Neck supple  Cardiovascular: Normal rate and regular rhythm  Pulmonary/Chest: Effort normal and breath sounds normal    Abdominal: Soft  Bowel sounds are normal    Musculoskeletal: Normal range of motion  Neurological: She is alert and oriented to person, place, and time  Skin: Skin is warm and dry  Psychiatric: She has a normal mood and affect         Lab Results:   Admission on 08/22/2018   Component Date Value    Sodium 08/22/2018 139     Potassium 08/22/2018 3 9     Chloride 08/22/2018 101     CO2 08/22/2018 28     Anion Gap 08/22/2018 10     BUN 08/22/2018 11     Creatinine 08/22/2018 0 66     Glucose 08/22/2018 85     Calcium 08/22/2018 9 1     eGFR 08/22/2018 90     WBC 08/22/2018 7 89     RBC 08/22/2018 4 25     Hemoglobin 08/22/2018 13 0     Hematocrit 08/22/2018 39 6     MCV 08/22/2018 93     MCH 08/22/2018 30 6     MCHC 08/22/2018 32 8     RDW 08/22/2018 14 3     MPV 08/22/2018 10 6     Platelets 53/17/8642 265     nRBC 08/22/2018 0     Neutrophils Relative 08/22/2018 49     Lymphocytes Relative 08/22/2018 37     Monocytes Relative 08/22/2018 10     Eosinophils Relative 08/22/2018 4     Basophils Relative 08/22/2018 0     Neutrophils Absolute 08/22/2018 3 86     Lymphocytes Absolute 08/22/2018 2 90     Monocytes Absolute 08/22/2018 0 78     Eosinophils Absolute 08/22/2018 0 32     Basophils Absolute 08/22/2018 0 03      Imaging: Ct Renal Stone Study Abdomen Pelvis Wo Contrast    Result Date: 8/21/2018  Narrative: CT ABDOMEN AND PELVIS WITHOUT IV CONTRAST - LOW DOSE RENAL STONE INDICATION:   R10 9: Unspecified abdominal pain  COMPARISON:  None  TECHNIQUE:  Low dose thin section CT examination of the abdomen and pelvis was performed without intravenous or oral contrast according to a protocol specifically designed to evaluate for urinary tract calculus  Axial, sagittal, and coronal 2D reformatted images were created from the source data and submitted for interpretation  Evaluation for pathology in the abdomen and pelvis that is unrelated to urinary tract calculi is limited  Radiation dose length product (DLP) for this visit:  349 mGy-cm   This examination, like all CT scans performed in the [facility], was performed utilizing techniques to minimize radiation dose exposure, including the use of iterative reconstruction and automated exposure control  FINDINGS: RIGHT KIDNEY AND URETER: No urinary tract calculi  No hydronephrosis or hydroureter  Mild bulging of the inferior right lateral kidney, correlate with nonemergent outpatient renal ultrasound  LEFT KIDNEY AND URETER: No urinary tract calculi  No hydronephrosis or hydroureter  URINARY BLADDER: Unremarkable  Small hiatal hernia  Left adrenal adenoma  Limited low radiation dose noncontrast CT evaluation demonstrates no clinically significant abnormality of liver, spleen, pancreas, or adrenal glands  No calcified gallstones or gallbladder wall thickening noted  No ascites or bulky lymphadenopathy on this limited noncontrast study  Colonic diverticula are noted, without evidence to suggest acute diverticulitis  Visualized bowel appears otherwise unremarkable  Limited evaluation demonstrates no evidence to suggest acute appendicitis  No acute fracture or destructive osseous lesion is identified  Degenerative changes to the lumbar spine is identified including multilevel neural foraminal narrowing     Left gluteal stimulator device is identified entering the left L3 sacral foramen and terminating medial to the left piriformis muscle  Impression: 1  Colonic diverticulosis  2   Left adrenal adenoma  3   Small hiatal hernia  4   Mild bulging of the inferior right lateral kidney, correlate with nonemergent outpatient renal ultrasound  This could be due to normal contour lobulation  Correlate with urinalysis  Workstation performed: [id number]     EKG, Pathology, and Other Studies: I have personally reviewed pertinent reports  Assessment/Plan       Pyelonephritis  patient with reported drug resistant E coli, will wait current sensitivities failed cephalosporins and Levaquin as an outpatient    Will ertapenem, provide gentle IV fluids and Tylenol as needed    Hypothyroidism continue Synthroid    Neuropathy  continue Neurontin    Back spasm  continue Zanaflex    Depression/anxiety mood stable continue Effexor and Klonopin    GERD   continue PPI for acid control          Full code    Expect greater than 2 midnight stay

## 2018-08-22 NOTE — DISCHARGE INSTRUCTIONS
Kidney Infection   WHAT YOU NEED TO KNOW:   A kidney infection, or pyelonephritis, is a bacterial infection  The infection usually starts in your bladder or urethra and moves into your kidney  One or both kidneys may be infected  DISCHARGE INSTRUCTIONS:   Return to the emergency department if:   · You have a fever and chills  · You cannot stop vomiting  · You have severe pain in your abdomen, lower back, or sides  Contact your healthcare provider if:   · You continue to have a fever after you take antibiotics for 3 days  · You have pain when you urinate, even after treatment  · Your signs and symptoms return  · You have questions or concerns about your condition or care  Medicines: You may  need any of the following:  · Antibiotics  treat your bacterial infection  · Acetaminophen  decreases pain and fever  It is available without a doctor's order  Ask how much to take and how often to take it  Follow directions  Read the labels of all other medicines you are using to see if they also contain acetaminophen, or ask your doctor or pharmacist  Acetaminophen can cause liver damage if not taken correctly  Do not use more than 4 grams (4,000 milligrams) total of acetaminophen in one day  · NSAIDs , such as ibuprofen, help decrease swelling, pain, and fever  This medicine is available with or without a doctor's order  NSAIDs can cause stomach bleeding or kidney problems in certain people  If you take blood thinner medicine, always ask if NSAIDs are safe for you  Always read the medicine label and follow directions  Do not give these medicines to children under 10months of age without direction from your child's healthcare provider  · Prescription pain medicine  may be given  Ask how to take this medicine safely  · Take your medicine as directed  Contact your healthcare provider if you think your medicine is not helping or if you have side effects   Tell him of her if you are allergic to any medicine  Keep a list of the medicines, vitamins, and herbs you take  Include the amounts, and when and why you take them  Bring the list or the pill bottles to follow-up visits  Carry your medicine list with you in case of an emergency  Drink liquids as directed: You may need to drink extra liquids to help flush your kidneys and urinary system  Water is the best liquid to drink  Ask your healthcare provider how much liquid to drink each day and which liquids are best for you  Urinate as soon as you feel the urge: This will help flush bacteria from your urinary system  Do not wait or hold your urine for too long  Clean your genital area every day with soap and water:  Wipe from front to back after you urinate or have a bowel movement  Wear cotton underwear  Fabrics such as nylon and polyester can stay damp  This can increase your risk for infection  Urinate within 15 minutes after you have sex  Follow up with your healthcare provider as directed:  Write down your questions so you remember to ask them during your visits  © 2017 [facility] Information is for End User's use only and may not be sold, redistributed or otherwise used for commercial purposes  All illustrations and images included in CareNotes® are the copyrighted property of A D A M , Inc  or [employer]  The above information is an  only  It is not intended as medical advice for individual conditions or treatments  Talk to your doctor, nurse or pharmacist before following any medical regimen to see if it is safe and effective for you  Urinary Tract Infection in Women   WHAT YOU NEED TO KNOW:   A urinary tract infection (UTI) is caused by bacteria that get inside your urinary tract  Most bacteria that enter your urinary tract come out when you urinate  If the bacteria stay in your urinary tract, you may get an infection  Your urinary tract includes your kidneys, ureters, bladder, and urethra  Urine is made in your kidneys, and it flows from the ureters to the bladder  Urine leaves the bladder through the urethra  A UTI is more common in your lower urinary tract, which includes your bladder and urethra  DISCHARGE INSTRUCTIONS:   Seek care immediately if:   · You are urinating very little or not at all  · You have a high fever with shaking chills  · You have side or back pain that gets worse  Contact your healthcare provider if:   · You have a fever  · You do not feel better after 2 days of taking antibiotics  · You are vomiting  · You have questions or concerns about your condition or care  Medicines:   · Antibiotics  help fight a bacterial infection  · Medicines  may be given to decrease pain and burning when you urinate  They will also help decrease the feeling that you need to urinate often  These medicines will make your urine orange or red  · Take your medicine as directed  Contact your healthcare provider if you think your medicine is not helping or if you have side effects  Tell him or her if you are allergic to any medicine  Keep a list of the medicines, vitamins, and herbs you take  Include the amounts, and when and why you take them  Bring the list or the pill bottles to follow-up visits  Carry your medicine list with you in case of an emergency  Follow up with your healthcare provider as directed:  Write down your questions so you remember to ask them during your visits  Prevent another UTI:   · Empty your bladder often  Urinate and empty your bladder as soon as you feel the need  Do not hold your urine for long periods of time  · Wipe from front to back after you urinate or have a bowel movement  This will help prevent germs from getting into your urinary tract through your urethra  · Drink liquids as directed  Ask how much liquid to drink each day and which liquids are best for you   You may need to drink more liquids than usual to help flush out the bacteria  Do not drink alcohol, caffeine, or citrus juices  These can irritate your bladder and increase your symptoms  Your healthcare provider may recommend cranberry juice to help prevent a UTI  · Urinate after you have sex  This can help flush out bacteria passed during sex  · Do not douche or use feminine deodorants  These can change the chemical balance in your vagina  · Change sanitary pads or tampons often  This will help prevent germs from getting into your urinary tract  · Do pelvic muscle exercises often  Pelvic muscle exercises may help you start and stop urinating  Strong pelvic muscles may help you empty your bladder easier  Squeeze these muscles tightly for 5 seconds like you are trying to hold back urine  Then relax for 5 seconds  Gradually work up to squeezing for 10 seconds  Do 3 sets of 15 repetitions a day, or as directed  © 2017 [facility] Information is for End User's use only and may not be sold, redistributed or otherwise used for commercial purposes  All illustrations and images included in CareNotes® are the copyrighted property of [employer]  or Everardo De La Fuente  The above information is an  only  It is not intended as medical advice for individual conditions or treatments  Talk to your doctor, nurse or pharmacist before following any medical regimen to see if it is safe and effective for you

## 2018-08-22 NOTE — ED PROVIDER NOTES
History  Chief Complaint   Patient presents with    Flank Pain     Was here yesterday and was told that she has an inflamed kidney  Was called today and told to come back in because antibiotics that she has given need to be changed  70 yo female c/o persistent right flank pain, radiating to right groin, associated with urinary urgency and frequency, and persistent generalized malaise, called today by urologist with whom she has follow up and told she needed to return to the ED for IV abx, based on urine culture results that were received  She was first evaluated with [facility] in [place], where she was diagnosed with UTI and started on Cipro  She was still feeling ill so went to her PCP yesterday who did outpatient CT and she was ultimately referred to ED for CT findings, concurrent with UA results c/w UTI again  In the ED she was given single dose of ceftriaxone and changed to Levofloxacin  She made her followup with urology as advised, and started abx as directed  She has no relief from symptoms, and then was called to come in for IV abx  She has the urine culture/sens report from [facility] with her which are showing E coli, with susceptibility only to meropenem and gentamicin  Urine cultrure at [facility] already shows E coli without sensitivities  She was only under the impression that she needed a single dose of IV abx like last night, but I explained that based on the sensitivity profile, the only antibiotics that will work are IV which would require admission to receive multiple doses  She is comfortable doing this  (See culture results from [facility] in media section and available in [facility])          History provided by:  Patient  Flank Pain   Pain location:  R flank  Pain quality: aching    Pain radiates to:  Groin  Pain severity:  Moderate  Onset quality:  Gradual  Duration:  1 week  Timing:  Constant  Progression:  Unchanged  Chronicity:  New  Relieved by:  Nothing  Worsened by: Nothing  Ineffective treatments: antibiotics  Associated symptoms: dysuria and fatigue    Associated symptoms: no chest pain, no chills, no cough, no diarrhea, no fever, no hematuria, no nausea, no shortness of breath, no sore throat and no vomiting    Elderly: she recalls having onset of UTIs frequently even as a child  Prior to Admission Medications   Prescriptions Last Dose Informant Patient Reported? Taking? Calcium Carbonate-Vitamin D (CALCIUM 600+D) 600-200 MG-UNIT TABS  Self Yes Yes   Sig: Take 1 tablet by mouth daily   EPINEPHrine (EPIPEN) 0 3 mg/0 3 mL SOAJ  Self Yes Yes   Sig: Inject into the shoulder, thigh, or buttocks   Linaclotide (LINZESS) 145 MCG CAPS  Self No Yes   Sig: Take 1 capsule (145 mcg total) by mouth daily   PEG 3350-KCl-NaCl-NaSulf-Na Asc-C (MOVIPREP) 100 g  Self Yes Yes   Sig: MoviPrep 100 gram-7 5 gram-2 691 gram oral powder packet   PREVACID 30 MG capsule  Self No Yes   Sig: Take 1 capsule (30 mg total) by mouth daily   SYNTHROID 25 MCG tablet  Self No Yes   Sig: Take 1 tablet (25 mcg total) by mouth daily   TiZANidine (ZANAFLEX) 2 MG capsule  Self Yes Yes   Sig: Take 2 mg by mouth daily   albuterol (PROAIR HFA) 90 mcg/act inhaler  Self Yes Yes   Sig: Inhale 1 puff once daily  baclofen 10 mg tablet  Self Yes Yes   budesonide-formoterol (SYMBICORT) 80-4 5 MCG/ACT inhaler  Self Yes Yes   Sig: Inhale 1 puff daily   butalbital-acetaminophen-caffeine (FIORICET,ESGIC) -40 mg per tablet  Self Yes Yes   Sig: Take 1 tablet by mouth every 4 (four) hours as needed   cefuroxime (CEFTIN) 500 mg tablet  Self Yes Yes   Sig: Daily   ciprofloxacin (CIPRO) 500 mg tablet  Self Yes Yes   Sig: Take 500 mg by mouth   clonazePAM (KlonoPIN) 0 5 mg tablet  Self No Yes   Sig: Take 1 tablet (0 5 mg total) by mouth 2 (two) times a day as needed for anxiety   cycloSPORINE (RESTASIS) 0 05 % ophthalmic emulsion  Self Yes Yes   Sig: Instill 1 drop in each eye twice daily     eletriptan (RELPAX) 40 MG tablet  Self No Yes   Sig: Take 1 tablet (40 mg total) by mouth once as needed (as needed) for up to 1 dose   erythromycin (ILOTYCIN) ophthalmic ointment  Self Yes Yes   Sig: erythromycin 5 mg/gram (0 5 %) eye ointment   fexofenadine (ALLEGRA ALLERGY) 180 MG tablet  Self Yes Yes   Sig: Take 180 mg by mouth daily     fluticasone (FLONASE) 50 mcg/act nasal spray  Self Yes Yes   Sig: Insert 1 spray in each nostril once daily  gabapentin (NEURONTIN) 300 mg capsule  Self Yes Yes   Sig: Take 1 capsule by mouth 3 (three) times a day   ibuprofen (MOTRIN) 600 mg tablet  Self No Yes   Sig: Take 1 tablet (600 mg total) by mouth 3 (three) times a day as needed for mild pain   levofloxacin (LEVAQUIN) 750 mg tablet   No Yes   Sig: Take 1 tablet (750 mg total) by mouth daily for 5 days   mometasone (NASONEX) 50 mcg/act nasal spray  Self Yes Yes   Sig: Insert 1 spray in each nostril as needed   PRN   olopatadine (PATANOL) 0 1 % ophthalmic solution  Self Yes Yes   Sig: Administer 1 drop to both eyes 2 (two) times a day     ondansetron (ZOFRAN) 4 mg tablet  Self No Yes   Sig: Take 1 tablet (4 mg total) by mouth every 6 (six) hours as needed (as needed)   pneumococcal 13-valent conjugate vaccine (PREVNAR 13)  Self Yes Yes   Sig: inject 0 5 milliliter intramuscularly   traMADol (ULTRAM) 50 mg tablet   No Yes   Sig: Take 1 tablet (50 mg total) by mouth every 6 (six) hours as needed for moderate pain for up to 5 days   venlafaxine (EFFEXOR-XR) 75 mg 24 hr capsule  Self Yes Yes   Sig: take 1 capsule by mouth once daily with food      Facility-Administered Medications: None       Past Medical History:   Diagnosis Date    Anxiety     Arthritis     Carpal tunnel syndrome     Cytomegalovirus infection ([facility] [address] )     Depression     Disease of thyroid gland     Diverticulosis     Esophageal reflux     GERD (gastroesophageal reflux disease)     Glaucoma     Herpes zoster     Interstitial cystitis     Kidney stones, calcium oxalate     Lumbar disc disorder     Malignant neoplasm of skin     Obesity     Sleep apnea        Past Surgical History:   Procedure Laterality Date    BLADDER EXTROPHY RECONSTRUCTION PELVIC SAGITTAL OSTEOTOMY      BREAST LUMPECTOMY      NEUROPLASTY / TRANSPOSITION MEDIAN NERVE AT CARPAL TUNNEL      REPLACEMENT TOTAL KNEE Bilateral     SINUS SURGERY      TOTAL ABDOMINAL HYSTERECTOMY         Family History   Problem Relation Age of Onset    Coronary artery disease Mother     Other Mother         domestic violence of adult    Hyperlipidemia Mother     Coronary artery disease Father     Coronary artery disease Maternal Grandmother     Diabetes Maternal Grandmother      I have reviewed and agree with the history as documented  Social History   Substance Use Topics    Smoking status: Never Smoker    Smokeless tobacco: Never Used    Alcohol use No        Review of Systems   Constitutional: Positive for fatigue  Negative for appetite change, chills and fever  HENT: Negative for sore throat  Respiratory: Negative for cough, shortness of breath and wheezing  Cardiovascular: Negative for chest pain and palpitations  Gastrointestinal: Positive for abdominal pain  Negative for diarrhea, nausea and vomiting  Genitourinary: Positive for dysuria, flank pain, frequency and urgency  Negative for hematuria  Musculoskeletal: Positive for back pain (right flank)  Negative for neck pain  Skin: Negative for rash  Neurological: Negative for dizziness, weakness and headaches  Psychiatric/Behavioral: Negative for suicidal ideas  All other systems reviewed and are negative  Physical Exam  Physical Exam   Constitutional: She is oriented to person, place, and time  Vital signs are normal  She appears well-developed and well-nourished  Non-toxic appearance  HENT:   Head: Normocephalic and atraumatic     Right Ear: Tympanic membrane and external ear normal    Left Ear: Tympanic membrane and external ear normal    Nose: Nose normal    Mouth/Throat: Oropharynx is clear and moist    Eyes: Conjunctivae and EOM are normal  Pupils are equal, round, and reactive to light  Neck: Normal range of motion and full passive range of motion without pain  Neck supple  No Brudzinski's sign and no Kernig's sign noted  Cardiovascular: Normal rate, regular rhythm, normal heart sounds, intact distal pulses and normal pulses  No murmur heard  Pulmonary/Chest: Effort normal and breath sounds normal  No tachypnea  No respiratory distress  She has no wheezes  Abdominal: Soft  Bowel sounds are normal  She exhibits no distension  There is tenderness  There is CVA tenderness (right)  There is no rigidity, no rebound and no guarding  Musculoskeletal: Normal range of motion  Right lower leg: She exhibits no swelling  Left lower leg: She exhibits no swelling  Lymphadenopathy:     She has no cervical adenopathy  Neurological: She is alert and oriented to person, place, and time  She has normal strength and normal reflexes  No cranial nerve deficit or sensory deficit  Coordination and gait normal  GCS eye subscore is 4  GCS verbal subscore is 5  GCS motor subscore is 6  Skin: Skin is warm and dry  No rash noted  She is not diaphoretic  No pallor  Psychiatric: She has a normal mood and affect  Her speech is normal and behavior is normal  Judgment and thought content normal  Cognition and memory are normal    Nursing note and vitals reviewed        Vital Signs  ED Triage Vitals [08/22/18 1607]   Temperature Pulse Respirations Blood Pressure SpO2   98 6 °F (37 °C) 76 16 (!) 175/81 98 %      Temp Source Heart Rate Source Patient Position - Orthostatic VS BP Location FiO2 (%)   Temporal Monitor Sitting Right arm --      Pain Score       8           Vitals:    08/22/18 1607   BP: (!) 175/81   Pulse: 76   Patient Position - Orthostatic VS: Sitting       Visual Acuity      ED Medications  Medications   sodium chloride 0 9 % infusion (125 mL/hr Intravenous New Bag 8/22/18 1714)   ertapenem Stephanie Givens) 1,000 mg in sodium chloride 0 9 % 50 mL IVPB (not administered)       Diagnostic Studies  Results Reviewed     Procedure Component Value Units Date/Time    Basic metabolic panel [[id number]] Collected:  08/22/18 1712    Lab Status: In process Specimen:  Blood from Arm, Right Updated:  08/22/18 1717    CBC and differential [[id number]] Collected:  08/22/18 1712    Lab Status: In process Specimen:  Blood from Arm, Right Updated:  08/22/18 1717                 No orders to display              Procedures  Procedures       Phone Contacts  ED Phone Contact    ED Course  ED Course as of Aug 22 1732   Wed Aug 22, 2018   1703 D/W Dr Alexi Winn who recommended ertapenem for treatment based on sensitivities  MDM  CritCare Time    Disposition  Final diagnoses:   Pyelonephritis     Time reflects when diagnosis was documented in both MDM as applicable and the Disposition within this note     Time User Action Codes Description Comment    8/22/2018  5:12 PM Marlin Moscoso Add [N12] Pyelonephritis       ED Disposition     ED Disposition Condition Comment    Admit  Case was discussed with Dr Gardenia Kawasaki and the patient's admission status was agreed to be Admission Status: inpatient status to the service of Dr Gardenia Kawasaki   Follow-up Information    None         Patient's Medications   Discharge Prescriptions    No medications on file     No discharge procedures on file      ED Provider  Electronically Signed by           Quirino Medrano MD  08/22/18 5208

## 2018-08-22 NOTE — PLAN OF CARE
DISCHARGE PLANNING     Discharge to home or other facility with appropriate resources Progressing        INFECTION - ADULT     Absence or prevention of progression during hospitalization Progressing        Knowledge Deficit     Patient/family/caregiver demonstrates understanding of disease process, treatment plan, medications, and discharge instructions Progressing        PAIN - ADULT     Verbalizes/displays adequate comfort level or baseline comfort level Progressing        SKIN/TISSUE INTEGRITY - ADULT     Skin integrity remains intact Progressing

## 2018-08-22 NOTE — ED PROVIDER NOTES
History  Chief Complaint   Patient presents with    Flank Pain     Pt states "I have blood in blood in my urine and I was seen at PCP put on antibiotics  not feeling any better  pain in my right flank that goes into my stomach"     Thursday started with R flank pain, Sunday seen at [facility] who told her she had blood in urine and (per ot she chronically has trace blood in urine) but since it was larger amount she was started on keflex  Has taken 5 doses of keflex and still with R flank pain so PCP sent for CT r/o kidney stone  CT performed and showed Colonic diverticulosis  Left adrenal adenoma  Small hiatal hernia  Mild bulging of the inferior right lateral kidney, correlate with nonemergent outpatient renal ultrasound  This could be due to normal contour lobulation  Correlate with urinalysis  History provided by:  Patient   used: No    Flank Pain   Pain location:  R flank  Pain quality: aching    Pain radiates to:  RLQ  Pain severity:  Moderate  Onset quality:  Gradual  Duration:  6 days  Timing:  Constant  Progression:  Worsening  Chronicity:  New  Relieved by: laying still  Worsened by:  Nothing  Ineffective treatments:  None tried  Associated symptoms: no chest pain, no chills, no cough, no diarrhea, no dysuria, no fatigue, no fever, no hematuria, no nausea, no shortness of breath, no sore throat, no vaginal bleeding, no vaginal discharge and no vomiting    Risk factors: has not had multiple surgeries        Prior to Admission Medications   Prescriptions Last Dose Informant Patient Reported? Taking?    Calcium Carbonate-Vitamin D (CALCIUM 600+D) 600-200 MG-UNIT TABS  Self Yes Yes   Sig: Take 1 tablet by mouth daily   EPINEPHrine (EPIPEN) 0 3 mg/0 3 mL SOAJ  Self Yes Yes   Sig: Inject into the shoulder, thigh, or buttocks   Linaclotide (LINZESS) 145 MCG CAPS  Self No Yes   Sig: Take 1 capsule (145 mcg total) by mouth daily   PEG 3350-KCl-NaCl-NaSulf-Na Asc-C (MOVIPREP) 100 g  Self Yes Yes   Sig: MoviPrep 100 gram-7 5 gram-2 691 gram oral powder packet   PREVACID 30 MG capsule  Self No Yes   Sig: Take 1 capsule (30 mg total) by mouth daily   SYNTHROID 25 MCG tablet  Self No Yes   Sig: Take 1 tablet (25 mcg total) by mouth daily   TiZANidine (ZANAFLEX) 2 MG capsule  Self Yes Yes   Sig: Take 2 mg by mouth daily   albuterol (PROAIR HFA) 90 mcg/act inhaler  Self Yes Yes   Sig: Inhale 1 puff once daily  baclofen 10 mg tablet  Self Yes Yes   budesonide-formoterol (SYMBICORT) 80-4 5 MCG/ACT inhaler  Self Yes Yes   Sig: Inhale 1 puff daily   butalbital-acetaminophen-caffeine (FIORICET,ESGIC) -40 mg per tablet  Self Yes Yes   Sig: Take 1 tablet by mouth every 4 (four) hours as needed   cefuroxime (CEFTIN) 500 mg tablet  Self Yes Yes   Sig: Daily   ciprofloxacin (CIPRO) 500 mg tablet  Self Yes Yes   Sig: Take 500 mg by mouth   clonazePAM (KlonoPIN) 0 5 mg tablet  Self No Yes   Sig: Take 1 tablet (0 5 mg total) by mouth 2 (two) times a day as needed for anxiety   cycloSPORINE (RESTASIS) 0 05 % ophthalmic emulsion  Self Yes Yes   Sig: Instill 1 drop in each eye twice daily  eletriptan (RELPAX) 40 MG tablet  Self No Yes   Sig: Take 1 tablet (40 mg total) by mouth once as needed (as needed) for up to 1 dose   erythromycin (ILOTYCIN) ophthalmic ointment  Self Yes Yes   Sig: erythromycin 5 mg/gram (0 5 %) eye ointment   fexofenadine (ALLEGRA ALLERGY) 180 MG tablet  Self Yes Yes   Sig: Take 180 mg by mouth daily     fluticasone (FLONASE) 50 mcg/act nasal spray  Self Yes Yes   Sig: Insert 1 spray in each nostril once daily  gabapentin (NEURONTIN) 300 mg capsule  Self Yes Yes   Sig: Take 1 capsule by mouth 3 (three) times a day   ibuprofen (MOTRIN) 600 mg tablet  Self No Yes   Sig: Take 1 tablet (600 mg total) by mouth 3 (three) times a day as needed for mild pain   mometasone (NASONEX) 50 mcg/act nasal spray  Self Yes Yes   Sig: Insert 1 spray in each nostril as needed   PRN   olopatadine (PATANOL) 0 1 % ophthalmic solution  Self Yes Yes   Sig: Administer 1 drop to both eyes 2 (two) times a day     ondansetron (ZOFRAN) 4 mg tablet  Self No Yes   Sig: Take 1 tablet (4 mg total) by mouth every 6 (six) hours as needed (as needed)   pneumococcal 13-valent conjugate vaccine (PREVNAR 13)  Self Yes Yes   Sig: inject 0 5 milliliter intramuscularly   venlafaxine (EFFEXOR-XR) 75 mg 24 hr capsule  Self Yes Yes   Sig: take 1 capsule by mouth once daily with food      Facility-Administered Medications: None       Past Medical History:   Diagnosis Date    Anxiety     Arthritis     Carpal tunnel syndrome     Cytomegalovirus infection ([address] )     Depression     Disease of thyroid gland     Diverticulosis     Esophageal reflux     GERD (gastroesophageal reflux disease)     Glaucoma     Herpes zoster     Interstitial cystitis     Kidney stones, calcium oxalate     Lumbar disc disorder     Malignant neoplasm of skin     Obesity     Sleep apnea        Past Surgical History:   Procedure Laterality Date    BLADDER EXTROPHY RECONSTRUCTION PELVIC SAGITTAL OSTEOTOMY      BREAST LUMPECTOMY      NEUROPLASTY / TRANSPOSITION MEDIAN NERVE AT CARPAL TUNNEL      REPLACEMENT TOTAL KNEE Bilateral     SINUS SURGERY      TOTAL ABDOMINAL HYSTERECTOMY         Family History   Problem Relation Age of Onset    Coronary artery disease Mother     Other Mother         domestic violence of adult    Hyperlipidemia Mother     Coronary artery disease Father     Coronary artery disease Maternal Grandmother     Diabetes Maternal Grandmother      I have reviewed and agree with the history as documented  Social History   Substance Use Topics    Smoking status: Never Smoker    Smokeless tobacco: Never Used    Alcohol use No        Review of Systems   Constitutional: Negative for appetite change, chills, fatigue and fever  HENT: Negative for sore throat  Eyes: Negative for visual disturbance     Respiratory: Negative for cough and shortness of breath  Cardiovascular: Negative for chest pain  Gastrointestinal: Positive for abdominal pain (RLQ)  Negative for diarrhea, nausea and vomiting  Genitourinary: Positive for flank pain  Negative for dysuria, frequency, hematuria, vaginal bleeding and vaginal discharge  Musculoskeletal: Negative for back pain, neck pain and neck stiffness  Skin: Negative for pallor and rash  Allergic/Immunologic: Negative for immunocompromised state  Neurological: Negative for light-headedness and headaches  Psychiatric/Behavioral: Negative for confusion  All other systems reviewed and are negative  Physical Exam  Physical Exam   Constitutional: She is oriented to person, place, and time  She appears well-developed and well-nourished  No distress  HENT:   Head: Normocephalic and atraumatic  Mouth/Throat: Oropharynx is clear and moist    Eyes: EOM are normal  Pupils are equal, round, and reactive to light  Neck: Normal range of motion  Neck supple  Cardiovascular: Normal rate and regular rhythm  No murmur heard  Pulmonary/Chest: Effort normal and breath sounds normal  No respiratory distress  Abdominal: Soft  Bowel sounds are normal  There is no tenderness  Musculoskeletal: Normal range of motion  + mild R CVA tenderness   Neurological: She is alert and oriented to person, place, and time  Skin: Skin is warm  Capillary refill takes less than 2 seconds  No rash noted  No pallor  Psychiatric: She has a normal mood and affect  Her behavior is normal    Nursing note and vitals reviewed        Vital Signs  ED Triage Vitals   Temperature Pulse Respirations Blood Pressure SpO2   08/21/18 2025 08/21/18 2025 08/21/18 2025 08/21/18 2025 08/21/18 2025   97 9 °F (36 6 °C) 64 18 156/80 99 %      Temp src Heart Rate Source Patient Position - Orthostatic VS BP Location FiO2 (%)   -- 08/21/18 2235 08/21/18 2235 08/21/18 2235 --    Monitor Lying Right arm       Pain Score 08/21/18 2025       7           Vitals:    08/21/18 2025 08/21/18 2235   BP: 156/80 136/60   Pulse: 64 64   Patient Position - Orthostatic VS:  Lying       Visual Acuity      ED Medications  Medications   ketorolac (TORADOL) injection 15 mg (15 mg Intravenous Given 8/21/18 2207)   fentanyl citrate (PF) 100 MCG/2ML 50 mcg (50 mcg Intravenous Given 8/21/18 2208)   ceftriaxone (ROCEPHIN) 1 g/50 mL in dextrose IVPB (0 mg Intravenous Stopped 8/21/18 2310)       Diagnostic Studies  Results Reviewed     Procedure Component Value Units Date/Time    Urine Microscopic [[id number]]  (Abnormal) Collected:  08/21/18 2257    Lab Status:  Final result Specimen:  Urine from Urine, Clean Catch Updated:  08/21/18 2327     RBC, UA 2-4 (A) /hpf      WBC, UA 30-50 (A) /hpf      Epithelial Cells Occasional /hpf      Bacteria, UA Moderate (A) /hpf     Urine culture [[id number]] Collected:  08/21/18 2257    Lab Status:   In process Specimen:  Urine from Urine, Clean Catch Updated:  08/21/18 2327    UA w Reflex to Microscopic w Reflex to Culture [[id number]]  (Abnormal) Collected:  08/21/18 2257    Lab Status:  Final result Specimen:  Urine from Urine, Clean Catch Updated:  08/21/18 2312     Color, UA Yellow     Clarity, UA Slightly Cloudy     Specific Gravity, UA >=1 030     pH, UA 5 5     Leukocytes, UA Moderate (A)     Nitrite, UA Positive (A)     Protein, UA Negative mg/dl      Glucose, UA Negative mg/dl      Ketones, UA Negative mg/dl      Urobilinogen, UA 0 2 E U /dl      Bilirubin, UA Negative     Blood, UA Moderate (A)    Protime-INR [[id number]]  (Normal) Collected:  08/21/18 2207    Lab Status:  Final result Specimen:  Blood from Arm, Left Updated:  08/21/18 2247     Protime 12 8 seconds      INR 0 95    APTT [[id number]]  (Normal) Collected:  08/21/18 2207    Lab Status:  Final result Specimen:  Blood from Arm, Left Updated:  08/21/18 2247     PTT 28 seconds     Comprehensive metabolic panel [[id number]]  (Abnormal) Collected:  08/21/18 2207    Lab Status:  Final result Specimen:  Blood from Arm, Left Updated:  08/21/18 2233     Sodium 139 mmol/L      Potassium 3 4 (L) mmol/L      Chloride 104 mmol/L      CO2 29 mmol/L      Anion Gap 6 mmol/L      BUN 13 mg/dL      Creatinine 0 74 mg/dL      Glucose 120 mg/dL      Calcium 8 9 mg/dL      AST 20 U/L      ALT 25 U/L      Alkaline Phosphatase 114 U/L      Total Protein 7 4 g/dL      Albumin 3 7 g/dL      Total Bilirubin 0 36 mg/dL      eGFR 83 ml/min/1 73sq m     Narrative:         National Kidney Disease Education Program recommendations are as follows:  GFR calculation is accurate only with a steady state creatinine  Chronic Kidney disease less than 60 ml/min/1 73 sq  meters  Kidney failure less than 15 ml/min/1 73 sq  meters  CBC and differential [[id number]] Collected:  08/21/18 2207    Lab Status:  Final result Specimen:  Blood from Arm, Left Updated:  08/21/18 2218     WBC 9 45 Thousand/uL      RBC 4 12 Million/uL      Hemoglobin 12 8 g/dL      Hematocrit 38 3 %      MCV 93 fL      MCH 31 1 pg      MCHC 33 4 g/dL      RDW 14 4 %      MPV 10 5 fL      Platelets 457 Thousands/uL      nRBC 0 /100 WBCs      Neutrophils Relative 52 %      Lymphocytes Relative 38 %      Monocytes Relative 7 %      Eosinophils Relative 4 %      Basophils Relative 0 %      Neutrophils Absolute 4 88 Thousands/µL      Lymphocytes Absolute 3 54 Thousands/µL      Monocytes Absolute 0 65 Thousand/µL      Eosinophils Absolute 0 35 Thousand/µL      Basophils Absolute 0 03 Thousands/µL                  No orders to display              Procedures  Procedures       Phone Contacts  ED Phone Contact    ED Course  ED Course as of Aug 22 0313   Tue Aug 21, 2018   2142 Pt seen and examined  Thursday started with R flank pain, Sunday seen at [facility] who told her she had blood in urine and (per ot she chronically has trace blood in urine) but since it was larger amount she was started on keflex   Has taken 5 doses of keflex and still with R flank pain so PCP sent for CT r/o kidney stone  CT performed and showed Colonic diverticulosis  Left adrenal adenoma  Small hiatal hernia  Mild bulging of the inferior right lateral kidney, correlate with nonemergent outpatient renal ultrasound  This could be due to normal contour lobulation  Correlate with urinalysis  Pt states "my doctor sent me in for IV antibiotics" - will dose with IV ceftriaxone  Pt aware we need urine when she is able to give sample, awaiting lab results  2237 Labs WNL - pt aware of need for urine sample  2329 Urinalysis positive nitrate, moderate leuks, moderate blood - sent for urine cx  Discussed with pt - feels comfortable going home on levaquin (will stop keflex) to cover for UTI/pyelo and ultram prn pain and f/u with PCP and urology  Understands ability to return if any concerns  MDM  CritCare Time    Disposition  Final diagnoses:   UTI (urinary tract infection)   Right flank pain   Pyelonephritis     Time reflects when diagnosis was documented in both MDM as applicable and the Disposition within this note     Time User Action Codes Description Comment    8/21/2018 11:33 PM Berenice Kayla M Add [N39 0] UTI (urinary tract infection)     8/21/2018 11:34 PM Berenice Kayla M Add [R10 9] Right flank pain     8/21/2018 11:34 PM Kana Zepeda Add [N12] Pyelonephritis       ED Disposition     ED Disposition Condition Comment    Discharge  Myrna Graham discharge to home/self care      Condition at discharge: Good        Follow-up Information     Follow up With Specialties Details Why  [address], PA-C Family Medicine, Physician Assistant Call  [address] [place] [address]  [address] [facility] [place] [facility] Call  [facility] [address] [phone]          Discharge Medication List as of 8/21/2018 11:35 PM      START taking these medications Details   levofloxacin (LEVAQUIN) 750 mg tablet Take 1 tablet (750 mg total) by mouth daily for 5 days, Starting Tue 8/21/2018, Until Sun 8/26/2018, Print      traMADol (ULTRAM) 50 mg tablet Take 1 tablet (50 mg total) by mouth every 6 (six) hours as needed for moderate pain for up to 5 days, Starting Tue 8/21/2018, Until Sun 8/26/2018, Print         CONTINUE these medications which have NOT CHANGED    Details   albuterol (PROAIR HFA) 90 mcg/act inhaler Inhale 1 puff once daily  , Historical Med      baclofen 10 mg tablet Starting Wed 5/30/2018, Historical Med      budesonide-formoterol (SYMBICORT) 80-4 5 MCG/ACT inhaler Inhale 1 puff daily, Historical Med      butalbital-acetaminophen-caffeine (FIORICET,ESGIC) -40 mg per tablet Take 1 tablet by mouth every 4 (four) hours as needed, Starting Fri 1/19/2018, Historical Med      Calcium Carbonate-Vitamin D (CALCIUM 600+D) 600-200 MG-UNIT TABS Take 1 tablet by mouth daily, Historical Med      cefuroxime (CEFTIN) 500 mg tablet Daily, Historical Med      ciprofloxacin (CIPRO) 500 mg tablet Take 500 mg by mouth, Starting Sun 8/19/2018, Until Fri 8/24/2018, Historical Med      clonazePAM (KlonoPIN) 0 5 mg tablet Take 1 tablet (0 5 mg total) by mouth 2 (two) times a day as needed for anxiety, Starting Mon 5/21/2018, Normal      cycloSPORINE (RESTASIS) 0 05 % ophthalmic emulsion Instill 1 drop in each eye twice daily  , Historical Med      eletriptan (RELPAX) 40 MG tablet Take 1 tablet (40 mg total) by mouth once as needed (as needed) for up to 1 dose, Starting Tue 5/22/2018, Normal      EPINEPHrine (EPIPEN) 0 3 mg/0 3 mL SOAJ Inject into the shoulder, thigh, or buttocks, Historical Med      erythromycin (ILOTYCIN) ophthalmic ointment erythromycin 5 mg/gram (0 5 %) eye ointment, Historical Med      fexofenadine (ALLEGRA ALLERGY) 180 MG tablet Take 180 mg by mouth daily  , Historical Med      fluticasone (FLONASE) 50 mcg/act nasal spray Insert 1 spray in each nostril once daily  , Historical Med      gabapentin (NEURONTIN) 300 mg capsule Take 1 capsule by mouth 3 (three) times a day, Starting Wed 2/17/2016, Historical Med      ibuprofen (MOTRIN) 600 mg tablet Take 1 tablet (600 mg total) by mouth 3 (three) times a day as needed for mild pain, Starting Tue 4/3/2018, Normal      Linaclotide (LINZESS) 145 MCG CAPS Take 1 capsule (145 mcg total) by mouth daily, Starting Tue 5/29/2018, Normal      mometasone (NASONEX) 50 mcg/act nasal spray Insert 1 spray in each nostril as needed  PRN, Historical Med      olopatadine (PATANOL) 0 1 % ophthalmic solution Administer 1 drop to both eyes 2 (two) times a day  , Historical Med      ondansetron (ZOFRAN) 4 mg tablet Take 1 tablet (4 mg total) by mouth every 6 (six) hours as needed (as needed), Starting Tue 5/22/2018, Normal      PEG 3350-KCl-NaCl-NaSulf-Na Asc-C (MOVIPREP) 100 g MoviPrep 100 gram-7 5 gram-2 691 gram oral powder packet, Historical Med      pneumococcal 13-valent conjugate vaccine (PREVNAR 13) inject 0 5 milliliter intramuscularly, Historical Med      PREVACID 30 MG capsule Take 1 capsule (30 mg total) by mouth daily, Starting Wed 4/11/2018, Normal      SYNTHROID 25 MCG tablet Take 1 tablet (25 mcg total) by mouth daily, Starting Tue 6/12/2018, Normal      TiZANidine (ZANAFLEX) 2 MG capsule Take 2 mg by mouth daily, Starting Mon 2/19/2018, Historical Med      venlafaxine (EFFEXOR-XR) 75 mg 24 hr capsule take 1 capsule by mouth once daily with food, Historical Med           No discharge procedures on file      ED Provider  Electronically Signed by           Dacia Anton DO  08/22/18 [id number]

## 2018-08-22 NOTE — LETTER
[id number] [address] [place] [id number]  Dept: [phone]    August 26, 2018     Patient: Magdaleno Kemp   YOB: 1950   Date of Visit: 8/22/2018       To Whom it May Concern:    David Barbosa is under my professional care  She was seen in the hospital from 8/22/2018   to 08/26/18  She may return to work 8/31/18  If you have any questions or concerns, please don't hesitate to call           Sincerely,          Oli Michael, DO

## 2018-08-22 NOTE — PLAN OF CARE
DISCHARGE PLANNING     Discharge to home or other facility with appropriate resources Progressing        GENITOURINARY - ADULT     Maintains or returns to baseline urinary function Progressing        INFECTION - ADULT     Absence or prevention of progression during hospitalization Progressing        Knowledge Deficit     Patient/family/caregiver demonstrates understanding of disease process, treatment plan, medications, and discharge instructions Progressing        PAIN - ADULT     Verbalizes/displays adequate comfort level or baseline comfort level Progressing        SKIN/TISSUE INTEGRITY - ADULT     Skin integrity remains intact Progressing

## 2018-08-23 ENCOUNTER — TELEPHONE (OUTPATIENT)
Dept: FAMILY MEDICINE CLINIC | Facility: CLINIC | Age: 68
End: 2018-08-23

## 2018-08-23 LAB — BACTERIA UR CULT: ABNORMAL

## 2018-08-23 PROCEDURE — 99232 SBSQ HOSP IP/OBS MODERATE 35: CPT | Performed by: INTERNAL MEDICINE

## 2018-08-23 RX ORDER — HYDRALAZINE HYDROCHLORIDE 20 MG/ML
10 INJECTION INTRAMUSCULAR; INTRAVENOUS EVERY 6 HOURS PRN
Status: DISCONTINUED | OUTPATIENT
Start: 2018-08-23 | End: 2018-08-26 | Stop reason: HOSPADM

## 2018-08-23 RX ADMIN — GABAPENTIN 300 MG: 300 CAPSULE ORAL at 08:40

## 2018-08-23 RX ADMIN — ENOXAPARIN SODIUM 40 MG: 40 INJECTION SUBCUTANEOUS at 08:40

## 2018-08-23 RX ADMIN — VENLAFAXINE HYDROCHLORIDE 75 MG: 75 CAPSULE, EXTENDED RELEASE ORAL at 08:40

## 2018-08-23 RX ADMIN — ERTAPENEM SODIUM 1000 MG: 1 INJECTION, POWDER, LYOPHILIZED, FOR SOLUTION INTRAMUSCULAR; INTRAVENOUS at 17:03

## 2018-08-23 RX ADMIN — TRAMADOL HYDROCHLORIDE 50 MG: 50 TABLET, FILM COATED ORAL at 06:32

## 2018-08-23 RX ADMIN — LORATADINE 10 MG: 10 TABLET ORAL at 08:40

## 2018-08-23 RX ADMIN — GABAPENTIN 300 MG: 300 CAPSULE ORAL at 13:45

## 2018-08-23 RX ADMIN — DEXTRAN 70 AND HYPROMELLOSE 2910 1 DROP: 1; 3 SOLUTION/ DROPS OPHTHALMIC at 08:39

## 2018-08-23 RX ADMIN — TRAMADOL HYDROCHLORIDE 50 MG: 50 TABLET, FILM COATED ORAL at 13:45

## 2018-08-23 RX ADMIN — GABAPENTIN 300 MG: 300 CAPSULE ORAL at 20:50

## 2018-08-23 RX ADMIN — TRAMADOL HYDROCHLORIDE 50 MG: 50 TABLET, FILM COATED ORAL at 20:52

## 2018-08-23 RX ADMIN — TIZANIDINE 2 MG: 2 TABLET ORAL at 08:40

## 2018-08-23 RX ADMIN — LEVOTHYROXINE SODIUM 25 MCG: 25 TABLET ORAL at 06:29

## 2018-08-23 RX ADMIN — DEXTRAN 70 AND HYPROMELLOSE 2910 1 DROP: 1; 3 SOLUTION/ DROPS OPHTHALMIC at 20:50

## 2018-08-23 RX ADMIN — PANTOPRAZOLE SODIUM 40 MG: 40 TABLET, DELAYED RELEASE ORAL at 06:29

## 2018-08-23 RX ADMIN — BUDESONIDE AND FORMOTEROL FUMARATE DIHYDRATE 1 PUFF: 80; 4.5 AEROSOL RESPIRATORY (INHALATION) at 08:39

## 2018-08-23 NOTE — SOCIAL WORK
CM met with pt at bedside to discuss discharge needs  Pt lives in a house with her   ADL's are completed independently with no DME use  PCP identified as Bogdan Ross  Pharmacy identified as [facility] or [facility]  Pt reports hx with [facility]  Pt also reports OPT services at [facility]  Pt does drive and will have a ride home at D/C  Pt identified her POA's are her  and her daughter  No needs expressed or identified  CM following as needed

## 2018-08-23 NOTE — PLAN OF CARE

## 2018-08-23 NOTE — TELEPHONE ENCOUNTER
Spoke to patient's , he stated that she got admitted to [facility] - CLOSED yesterday on 8/22/18 due to having extreme pain and the medication she was on would not help her

## 2018-08-23 NOTE — CASE MANAGEMENT
Initial Clinical Review    Admission: Date/Time/Statement: 8/22/18 @ 1714     Orders Placed This Encounter   Procedures    Inpatient Admission (expected length of stay for this patient is greater than two midnights)     Standing Status:   Standing     Number of Occurrences:   1     Order Specific Question:   Admitting Physician     Answer:   Chun Greenberg     Order Specific Question:   Level of Care     Answer:   Med Surg [16]     Order Specific Question:   Estimated length of stay     Answer:   More than 2 Midnights     Order Specific Question:   Certification     Answer:   I certify that inpatient services are medically necessary for this patient for a duration of greater than two midnights  See H&P and MD Progress Notes for additional information about the patient's course of treatment  ED: Date/Time/Mode of Arrival:   ED Arrival Information     Expected Arrival Acuity Means of Arrival Escorted By Service Admission Type    - 8/22/2018 16:00 Urgent Walk-In Self General Medicine Urgent    Arrival Complaint    Kidney Area Pain          Chief Complaint:   Chief Complaint   Patient presents with    Flank Pain     Was here yesterday and was told that she has an inflamed kidney  Was called today and told to come back in because antibiotics that she has given need to be changed  History of Illness:  69 y o  female who presents with flank pain  Patient reports having a urinary tract infection, treated with multiple antibiotics, recent urinary chronic arterial growing E coli reported drug resistant  Only sensitivity is to ertapenem  Back flank pain progressing over 1 to 2 days, denies specific urine symptoms such as frequency pain however       ED Vital Signs:   ED Triage Vitals [08/22/18 1607]   Temperature Pulse Respirations Blood Pressure SpO2   98 6 °F (37 °C) 76 16 (!) 175/81 98 %      Temp Source Heart Rate Source Patient Position - Orthostatic VS BP Location FiO2 (%)   Temporal Monitor Sitting Right arm --      Pain Score       8        Wt Readings from Last 1 Encounters:   08/22/18 106 kg (234 lb)       Abnormal Labs/Diagnostic Test Results:     UA w Reflex to Microscopic w Reflex to Culture [[id number]] (Abnormal)   Lab Status: Final result   08/21/23 Specimen: Urine from Urine, Clean Catch    Color, UA  yellow    Clarity, UA  cloudy    Specific Gravity, UA >=1 030 1 003 - 1 030    pH, UA 5 5 4 5 - 8 0    Leukocytes, UA Moderate (A) Negative    Nitrite, UA Positive (A) Negative    Protein, UA Negative Negative mg/dl    Glucose, UA Negative Negative mg/dl    Ketones, UA Negative Negative mg/dl    Urobilinogen, UA 0 2 0 2, 1 0 E U /dl E U /dl    Bilirubin, UA Negative Negative    Blood, UA Moderate (A) >=2000 (4+), Trace-Intact, Negative   Urine Microscopic [[id number]] (Abnormal)   Lab Status: Final result Specimen: Urine from Urine, Clean Catch    RBC, UA 2-4 (A) None Seen, 0-5 /hpf    WBC, UA 30-50 (A) None Seen, 0-5, 5-55, 5-65 /hpf    Epithelial Cells Occasional None Seen, Occasional /hpf    Bacteria, UA Moderate (A) None Seen,      Urine culture [[id number]] (Abnormal) Collected: 08/21/18 2257   Lab Status: Preliminary result Specimen: Urine from Urine, Clean Catch Updated: 08/23/18 0850    Urine Culture >100,000 cfu/ml Gram Negative King resembling Escherichia coli (A)         ED Treatment:   Medication Administration from 08/22/2018 1600 to 08/22/2018 1807       Date/Time Order Dose Route Action Action by Comments     08/22/2018 1714 sodium chloride 0 9 % infusion 125 mL/hr Intravenous [address] Enedina Moore RN      08/22/2018 1807 ertapenem Maira Quaker) 1,000 mg in sodium chloride 0 9 % 50 mL IVPB 0 mg Intravenous Stopped Purvi Mckeon RN      08/22/2018 1737 ertapenem Maira Quaker) 1,000 mg in sodium chloride 0 9 % 50 mL IVPB 1,000 mg Intravenous New Bag Enedina Moore RN           Past Medical/Surgical History:    Active Ambulatory Problems     Diagnosis Date Noted    DDD (degenerative disc disease), lumbar 03/30/2016    Depression 01/30/2017    Generalized anxiety disorder 08/04/2017    Hypertension, benign 01/30/2017    Hypothyroidism 01/30/2017    Low back pain 09/10/2015    Lumbar facet arthropathy ([facility] [address] ) 03/30/2016    Malaise and fatigue 01/31/2017    Migraine 01/30/2017    Mild intermittent asthma without complication 15/85/2101    Neuropathy 01/30/2017    Osteoarthritis 01/30/2017    Overactive bladder 01/30/2017    Pain, foot, chronic, right 02/22/2017    Palpitations 01/30/2017    Post herpetic neuralgia 02/17/2016    Seasonal allergies 01/30/2017    Lyme disease 02/12/2018    Gastroesophageal reflux disease without esophagitis 04/03/2018    ALLA (obstructive sleep apnea) 05/22/2018    Chronic cough 08/01/2018    Acute right flank pain 08/21/2018     Resolved Ambulatory Problems     Diagnosis Date Noted    Mixed hyperlipidemia 01/30/2017     Past Medical History:   Diagnosis Date    Anxiety     Arthritis     Carpal tunnel syndrome     Cytomegalovirus infection ([facility] [address] )     Depression     Disease of thyroid gland     Diverticulosis     Esophageal reflux     GERD (gastroesophageal reflux disease)     Glaucoma     Herpes zoster     Interstitial cystitis     Kidney stones, calcium oxalate     Lumbar disc disorder     Malignant neoplasm of skin     Obesity     Sleep apnea        Admitting Diagnosis: Pyelonephritis [N12]  Flank pain [R10 9]    Age/Sex: 69 y o  female    Assessment/Plan:     Pyelonephritis             patient with reported drug resistant E coli, will wait current sensitivities failed cephalosporins and Levaquin as an outpatient    Will ertapenem, provide gentle IV fluids and Tylenol as needed     Hypothyroidism           continue Synthroid     Neuropathy                 continue Neurontin     Back spasm                continue Zanaflex     Depression/anxiety     mood stable continue Effexor and Klonopin     GERD                          continue PPI for acid control                    Full code     Expect greater than 2 midnight stay     Admission Orders:  Inpatient/MedSurg  Bilateral Sequential Compression Device  Urine Culture Pending  Scheduled Meds:   Current Facility-Administered Medications:  acetaminophen 650 mg Oral Q6H PRN Kamron Londono, DO   budesonide-formoterol 1 puff Inhalation Daily Kamron Londono, DO   clonazePAM 0 5 mg Oral BID PRN Kamron Londono, DO   dextran 70-hypromellose 1 drop Both Eyes Q12H [address] [facility], DO   enoxaparin 40 mg Subcutaneous Q24H [facility] Kamron Londono, DO   ertapenem 1,000 mg Intravenous Q24H Pepe Nowak, DO   fluticasone 1 spray Each Nare Daily Kamron Londono, DO   gabapentin 300 mg Oral TID Kamron Londono, DO   hydrALAZINE 10 mg Intravenous Q6H PRN Kamron Londono, DO   levothyroxine 25 mcg Oral Daily Before [address], DO   loratadine 10 mg Oral Daily Kamron Londono, DO   pantoprazole 40 mg Oral Early Morning Kamron Londono, DO   tiZANidine 2 mg Oral Daily Kamron Londono, DO   traMADol 50 mg Oral Q6H PRN Kamron Londono, DO   venlafaxine 75 mg Oral Daily Kamron Londono,      Continuous Infusions:    PRN Meds:   acetaminophen    clonazePAM    hydrALAZINE    traMADol

## 2018-08-23 NOTE — PROGRESS NOTES
Progress Note - Adam Lira 69 y o  female MRN: [id number]    Unit/Bed#: [place] 2 -01 Encounter: [id number]    Assessment/Plan:    Drug resistant UTI/ pyelonephritis   E coli,  Awaiting sensitivities are reported resistant sensitive only to ertapenem  Will continue ertapenem until sensitivities are finalized    Hypothyroidism     continue Synthroid     neuropathy      pain control with Neurontin     chronic back pain/  Spasm    continue Zanaflex     MDD/ anxiety      mood stable with Effexor and Klonopin     GERD       continue PPI for acid control     hypertension      possibly related to IV fluids will discontinue and monitor with p r n  hydralazine    Subjective:    flank pain last today,  Denies chest pain shortness of breath nausea vomiting diarrhea no fevers chills voiding without pain    Objective:     Vitals: Blood pressure (!) 171/69, pulse 66, temperature 98 6 °F (37 °C), temperature source Tympanic, resp  rate 18, height 5' 4" (1 626 m), weight 106 kg (234 lb), SpO2 98 %  ,Body mass index is 40 17 kg/m²          Results from last 7 days  Lab Units 08/22/18  1712 08/21/18  2207   WBC Thousand/uL 7 89 9 45   HEMOGLOBIN g/dL 13 0 12 8   HEMATOCRIT % 39 6 38 3   PLATELETS Thousands/uL 265 250   INR   --  0 95       Results from last 7 days  Lab Units 08/22/18  1712 08/21/18  2207   SODIUM mmol/L 139 139   POTASSIUM mmol/L 3 9 3 4*   CHLORIDE mmol/L 101 104   CO2 mmol/L 28 29   BUN mg/dL 11 13   CREATININE mg/dL 0 66 0 74   CALCIUM mg/dL 9 1 8 9   TOTAL PROTEIN g/dL  --  7 4   BILIRUBIN TOTAL mg/dL  --  0 36   ALK PHOS U/L  --  114   ALT U/L  --  25   AST U/L  --  20   GLUCOSE RANDOM mg/dL 85 120       Scheduled Meds:    Current Facility-Administered Medications:  acetaminophen 650 mg Oral Q6H PRN Helene Nipple, DO    budesonide-formoterol 1 puff Inhalation Daily Helene Nipple, DO    clonazePAM 0 5 mg Oral BID PRN Helene Nipple, DO    dextran 70-hypromellose 1 drop Both Eyes Q12H [address] [facility], DO enoxaparin 40 mg Subcutaneous Q24H [address] [facility], DO    ertapenem 1,000 mg Intravenous Q24H Charles Mo, DO    fluticasone 1 spray Each Nare Daily Charles Mo, DO    gabapentin 300 mg Oral TID Charles Mo, DO    levothyroxine 25 mcg Oral Daily Before [facility], DO    loratadine 10 mg Oral Daily Charles Mo, DO    pantoprazole 40 mg Oral Early Morning Charles Mo, DO    sodium chloride 0 9 % with KCl 20 mEq/L 75 mL/hr Intravenous Continuous Charles Mo, DO Last Rate: 75 mL/hr (08/22/18 1924)   tiZANidine 2 mg Oral Daily Charles Mo, DO    traMADol 50 mg Oral Q6H PRN Charles Mo, DO    venlafaxine 75 mg Oral Daily Charles Mo, DO        Continuous Infusions:    sodium chloride 0 9 % with KCl 20 mEq/L 75 mL/hr Last Rate: 75 mL/hr (08/22/18 1924)     Physical exam:  General appearance: alert oriented x3 no distress interaction appropriate   Head/Eyes:  nonicteric PERRL EOMI  Neck:  supple  Lungs: CTA bilateral no wheezing rhonchi or rales  Heart: normal S1 S2 regular  Abdomen: Soft nontender with bowel sounds  Extremities: no edema  Skin: no rash    Invasive Devices     Peripheral Intravenous Line            Peripheral IV 08/22/18 Right Antecubital less than 1 day                  VTE Pharmacologic Prophylaxis:  Lovenox  VTE Mechanical Prophylaxis:  SCDs                    Counseling / Coordination of Care  Total floor / unit time spent today  30  minutes  Greater than 50% of total time was spent with the patient and / or family counseling and / or coordination of care    A description of the counseling / coordination of care:

## 2018-08-24 PROCEDURE — 99232 SBSQ HOSP IP/OBS MODERATE 35: CPT | Performed by: INTERNAL MEDICINE

## 2018-08-24 PROCEDURE — 99222 1ST HOSP IP/OBS MODERATE 55: CPT | Performed by: INTERNAL MEDICINE

## 2018-08-24 RX ADMIN — FLUTICASONE PROPIONATE 1 SPRAY: 50 SPRAY, METERED NASAL at 08:19

## 2018-08-24 RX ADMIN — LEVOTHYROXINE SODIUM 25 MCG: 25 TABLET ORAL at 06:03

## 2018-08-24 RX ADMIN — ENOXAPARIN SODIUM 40 MG: 40 INJECTION SUBCUTANEOUS at 08:19

## 2018-08-24 RX ADMIN — TIZANIDINE 2 MG: 2 TABLET ORAL at 08:19

## 2018-08-24 RX ADMIN — GABAPENTIN 300 MG: 300 CAPSULE ORAL at 20:48

## 2018-08-24 RX ADMIN — GABAPENTIN 300 MG: 300 CAPSULE ORAL at 08:19

## 2018-08-24 RX ADMIN — ERTAPENEM SODIUM 1000 MG: 1 INJECTION, POWDER, LYOPHILIZED, FOR SOLUTION INTRAMUSCULAR; INTRAVENOUS at 17:15

## 2018-08-24 RX ADMIN — LORATADINE 10 MG: 10 TABLET ORAL at 08:19

## 2018-08-24 RX ADMIN — ACETAMINOPHEN 650 MG: 325 TABLET, FILM COATED ORAL at 13:54

## 2018-08-24 RX ADMIN — TRAMADOL HYDROCHLORIDE 50 MG: 50 TABLET, FILM COATED ORAL at 06:03

## 2018-08-24 RX ADMIN — DEXTRAN 70 AND HYPROMELLOSE 2910 1 DROP: 1; 3 SOLUTION/ DROPS OPHTHALMIC at 20:48

## 2018-08-24 RX ADMIN — VENLAFAXINE HYDROCHLORIDE 75 MG: 75 CAPSULE, EXTENDED RELEASE ORAL at 08:19

## 2018-08-24 RX ADMIN — PANTOPRAZOLE SODIUM 40 MG: 40 TABLET, DELAYED RELEASE ORAL at 06:03

## 2018-08-24 RX ADMIN — GABAPENTIN 300 MG: 300 CAPSULE ORAL at 17:15

## 2018-08-24 RX ADMIN — TRAMADOL HYDROCHLORIDE 50 MG: 50 TABLET, FILM COATED ORAL at 20:50

## 2018-08-24 RX ADMIN — DEXTRAN 70 AND HYPROMELLOSE 2910 1 DROP: 1; 3 SOLUTION/ DROPS OPHTHALMIC at 08:19

## 2018-08-24 RX ADMIN — BUDESONIDE AND FORMOTEROL FUMARATE DIHYDRATE 1 PUFF: 80; 4.5 AEROSOL RESPIRATORY (INHALATION) at 08:19

## 2018-08-24 NOTE — PROGRESS NOTES
Progress Note - Stevie Carrasco 69 y o  female MRN: [id number]    Unit/Bed#: [place] 2 -01 Encounter: [id number]    Assessment/Plan:    ESBL E coli UTI/pyelonephritis requiring IV antibiotics, reviewed sensitivities and will now consult ID but appear no p o  agents affective, patient presented with flank pain and weakness associated continue ertapenem    Hypothyroidism   continue Synthroid    Neuropathy    pain control with Neurontin    Chronic back pain/spasm  continue Zanaflex    MDD/anxiety    mood stable with Effexor and Klonopin    GERD     continue PPI for acid control    Hypertension    DC IV fluid and pressure normalizing continue p r n  hydralazine    Subjective:   Flank pain improving, weakness resolving denies chest pain shortness of breath nausea vomiting diarrhea no fevers chills appetite is good    Objective:     Vitals: Blood pressure 141/70, pulse 66, temperature 98 °F (36 7 °C), temperature source Temporal, resp  rate 18, height 5' 4" (1 626 m), weight 106 kg (234 lb), SpO2 96 %  ,Body mass index is 40 17 kg/m²          Results from last 7 days  Lab Units 08/22/18  1712 08/21/18  2207   WBC Thousand/uL 7 89 9 45   HEMOGLOBIN g/dL 13 0 12 8   HEMATOCRIT % 39 6 38 3   PLATELETS Thousands/uL 265 250   INR   --  0 95       Results from last 7 days  Lab Units 08/22/18  1712 08/21/18  2207   SODIUM mmol/L 139 139   POTASSIUM mmol/L 3 9 3 4*   CHLORIDE mmol/L 101 104   CO2 mmol/L 28 29   BUN mg/dL 11 13   CREATININE mg/dL 0 66 0 74   CALCIUM mg/dL 9 1 8 9   TOTAL PROTEIN g/dL  --  7 4   BILIRUBIN TOTAL mg/dL  --  0 36   ALK PHOS U/L  --  114   ALT U/L  --  25   AST U/L  --  20   GLUCOSE RANDOM mg/dL 85 120       Scheduled Meds:    Current Facility-Administered Medications:  acetaminophen 650 mg Oral Q6H PRN Cathy Silvana, DO    budesonide-formoterol 1 puff Inhalation Daily Cathy Pina, DO    clonazePAM 0 5 mg Oral BID PRN Cathy Pina, DO    dextran 70-hypromellose 1 drop Both Eyes Q12H [address] [facility], DO    enoxaparin 40 mg Subcutaneous Q24H [address] [facility], DO    ertapenem 1,000 mg Intravenous Q24H Fareed Art, DO Last Rate: Stopped (08/23/18 5123)   fluticasone 1 spray Each Nare Daily Fareed Art, DO    gabapentin 300 mg Oral TID Fareed Art, DO    hydrALAZINE 10 mg Intravenous Q6H PRN Fareed Art, DO    levothyroxine 25 mcg Oral Daily Before [address], DO    loratadine 10 mg Oral Daily Fareed Art, DO    pantoprazole 40 mg Oral Early Morning Fareed Art, DO    tiZANidine 2 mg Oral Daily Fareed Art, DO    traMADol 50 mg Oral Q6H PRN Fareed Art, DO    venlafaxine 75 mg Oral Daily Fareed Art, DO        Continuous Infusions:     Physical exam:  General appearance:  Alert no distress interaction appropriate oriented x3   Head/Eyes:  Nonicteric PERRL EOMI  Neck:  Supple   Lungs: CTA bilateral no wheezing rhonchi or rales  Heart: normal S1 S2 regular  Abdomen: Soft nontender with bowel sounds  Extremities: no edema  Skin: no rash    Invasive Devices     Peripheral Intravenous Line            Peripheral IV 08/22/18 Right Antecubital 1 day                  VTE Pharmacologic Prophylaxis:  Lovenox  VTE Mechanical Prophylaxis:  SCDs                    Counseling / Coordination of Care  Total floor / unit time spent today 30  minutes  Greater than 50% of total time was spent with the patient and / or family counseling and / or coordination of care    A description of the counseling / coordination of care:

## 2018-08-24 NOTE — PLAN OF CARE
DISCHARGE PLANNING     Discharge to home or other facility with appropriate resources Progressing        DISCHARGE PLANNING - CARE MANAGEMENT     Discharge to post-acute care or home with appropriate resources Progressing        GENITOURINARY - ADULT     Maintains or returns to baseline urinary function Progressing        INFECTION - ADULT     Absence or prevention of progression during hospitalization Progressing        Knowledge Deficit     Patient/family/caregiver demonstrates understanding of disease process, treatment plan, medications, and discharge instructions Progressing        PAIN - ADULT     Verbalizes/displays adequate comfort level or baseline comfort level Progressing        Potential for Falls     Patient will remain free of falls Progressing        SKIN/TISSUE INTEGRITY - ADULT     Skin integrity remains intact Progressing

## 2018-08-24 NOTE — CONSULTS
Consultation - Infectious Disease   Jose El 69 y o  female MRN: [id number]  Unit/Bed#: [address] 2 -01 Encounter: [id number]    IMPRESSION & RECOMMENDATIONS:   1  E coli ESBL UTI  Patient failed outpatient treatment with Cipro and Levaquin  I reviewed the CT renal stone study which showed no calculi, no hydronephrosis, and mild bulging of the R kidney  She has experienced significant flank pain over the past week, this has improved somewhat overnight  Fortunately patient is not systemically ill  She has remained afebrile without leukocytosis  She is tolerating IV ertapenem without difficulty  -continue IV ertapenem  -monitor CBC  -follow up urine sensitivity, lab checking fosfomycin  -monitor vitals  -monitor urinary symptoms  -    2  Flank pain  Secondary to above  Patient also with chronic low back pain/spasms in lumbar spine region  Patient with notable improvement overnight since starting the IV antibiotics  Will continue to monitor    -antibiotic as above   -pain management per primary service  3  History of kidney stones  Recent CT renal stone study was negative for current urinary tract calculi  HISTORY OF PRESENT ILLNESS:  Reason for Consult:  Pyelonephritis  HPI: Jose El is a 69y o  year old female who presented to the [place] [address] Emergency Department on 08/22/2018 after being told she needed to go in for IV antibiotics  The patient has had ongoing issues with urinary tract infection and right flank pain  She was initially assessed 1 week ago at the [facility] urgent care center and was diagnosed with the UTI  She was started on Cipro  She went to follow up with her PCP a few days later because she was not feeling better  They recommended she go to the ED for a dose of ceftriaxone and then to continue taking PO antibiotics and ordered Levaquin    Patient reports she was then called and told that her urine cultures required additional IV antibiotics and that she should seek care at her local emergency department  Reports that over the week her flank pain became unbearable to the point where she didn't want to get up or move around  Upon arrival to the ED the patient's temperature was 98 6° with a heart rate of 76  Patient was hypertensive with a blood pressure of 175/81  Her white blood cell count was 9 45  Her creatinine was 0 74  Her GFR was 83  Urinalysis in the emergency department showed moderate blood, positive nitrates, moderate leukocytes, 2-4 RBC, 30-50 WBC, and moderate bacteria  Review of patient's outpatient urine cultures in Care everywhere showed her urine tested positive for E coli ESBL on 08/21/2018  She was started on IV Ertapenem  It also showed a CT renal stone study on 08/21/2018 showing no hydronephrosis, no calculi, mild bulging of the right kidney, left adrenal adenoma, small hiatal hernia, and colonic diverticulosis  It was explained to patient that she would require more than one dose of IV antibiotics and that she should be admitted  Patient agreed  Since her admission the patient has been continued on IV Ertapenem  She has remained clinically stable  We have been asked formal consult for pyelonephritis  Patient's past medical and surgical history significant for obstructive sleep apnea, skin cancer, kidney stones, shingles, GERD, anxiety, carpal tunnel syndrome, depression, obesity, lumbar discitis, interstitial cystitis, glaucoma, hypothyroidism, arthritis, CMV, TH, lumpectomy, median nerve transplant, bilateral knee replacement, sinus surgery, and bladder reconstructive surgery  Patient has documented allergies to sulfa drugs, amoxicillin, Augmentin, bees, and doxycycline  REVIEW OF SYSTEMS:  She denies dysuria but her low back pain persists  She reports this has improved somewhat since starting the IV antibiotics  She has mild lower abdominal/anterior pelvic discomfort but reports that is now tolerable   Denies nausea/vomiting  Denies headache  A complete 12 point system-based review of systems is otherwise negative  PAST MEDICAL HISTORY:  Past Medical History:   Diagnosis Date    Anxiety     Arthritis     Carpal tunnel syndrome     Cytomegalovirus infection ([address] )     Depression     Disease of thyroid gland     Diverticulosis     Esophageal reflux     GERD (gastroesophageal reflux disease)     Glaucoma     Herpes zoster     Interstitial cystitis     Kidney stones, calcium oxalate     Lumbar disc disorder     Malignant neoplasm of skin     Obesity     Sleep apnea      Past Surgical History:   Procedure Laterality Date    BLADDER EXTROPHY RECONSTRUCTION PELVIC SAGITTAL OSTEOTOMY      BREAST LUMPECTOMY      NEUROPLASTY / TRANSPOSITION MEDIAN NERVE AT CARPAL TUNNEL      REPLACEMENT TOTAL KNEE Bilateral     SINUS SURGERY      TOTAL ABDOMINAL HYSTERECTOMY       FAMILY HISTORY:  Non-contributory    SOCIAL HISTORY:  Social History   History   Alcohol Use No     History   Drug use: Unknown     History   Smoking Status    Never Smoker   Smokeless Tobacco    Never Used     ALLERGIES:  Allergies   Allergen Reactions    Sulfa Antibiotics Hives     swelling  swelling    Amoxicillin Hives    Augmentin Es-600  [Amoxicillin-Pot Clavulanate]     Bee Venom     Doxycycline Other (See Comments)     MEDICATIONS:  All current active medications have been reviewed      ANTIBIOTICS:  Ertapenem 2  Antibiotics 2    PHYSICAL EXAM:  HR:  [66-73] 66  Resp:  [18-19] 18  BP: (126-142)/(53-70) 141/70  SpO2:  [95 %-97 %] 96 %  Temp (24hrs), Av 3 °F (36 8 °C), Min:98 °F (36 7 °C), Max:98 9 °F (37 2 °C)  Current: Temperature: 98 °F (36 7 °C)    Intake/Output Summary (Last 24 hours) at 18 0995  Last data filed at 18 1733   Gross per 24 hour   Intake              290 ml   Output                0 ml   Net              290 ml     General Appearance:  Appearing well, nontoxic, and in no distress   Head: Normocephalic, without obvious abnormality, atraumatic   Eyes:  Conjunctiva pink and sclera anicteric, both eyes   Nose: Nares normal, mucosa normal, no drainage   Throat: Oropharynx moist without lesions   Neck: Supple, symmetrical, no adenopathy, no tenderness/mass/nodules   Back:   Symmetric, no curvature, ROM normal, mild CVA tenderness   Lungs:   Clear to auscultation bilaterally, respirations unlabored   Chest Wall:  No tenderness or deformity   Heart:  RRR; no murmur, rub or gallop   Abdomen:   Soft, mildly tender in lower abdomen/suprapubic region with palpation, non-distended, positive bowel sounds    Extremities: No cyanosis or clubbing, no edema   Skin: No rashes or lesions  No draining wounds noted  Lymph nodes: Cervical, supraclavicular nodes normal   Neurologic: Alert and oriented times 3     LABS, IMAGING, & OTHER STUDIES:  Lab Results:  I have personally reviewed pertinent labs  Results from last 7 days  Lab Units 08/22/18  1712 08/21/18  2207   WBC Thousand/uL 7 89 9 45   HEMOGLOBIN g/dL 13 0 12 8   PLATELETS Thousands/uL 265 250       Results from last 7 days  Lab Units 08/22/18  1712 08/21/18  2207   SODIUM mmol/L 139 139   POTASSIUM mmol/L 3 9 3 4*   CHLORIDE mmol/L 101 104   CO2 mmol/L 28 29   ANION GAP mmol/L 10 6   BUN mg/dL 11 13   CREATININE mg/dL 0 66 0 74   EGFR ml/min/1 73sq m 90 83   GLUCOSE RANDOM mg/dL 85 120   CALCIUM mg/dL 9 1 8 9   AST U/L  --  20   ALT U/L  --  25   ALK PHOS U/L  --  114   TOTAL PROTEIN g/dL  --  7 4   BILIRUBIN TOTAL mg/dL  --  0 36       Results from last 7 days  Lab Units 08/21/18  2257 08/21/18  1849   URINE CULTURE  >100,000 cfu/ml Escherichia coli ESBL* >100,000 cfu/ml Escherichia coli ESBL*     Imaging Studies:   I have personally reviewed pertinent imaging study reports and images in PACS      CT RENAL STONE STUDY ABDOMEN PELVIS WO CONTRAST 8/21/18  FINDINGS:  RIGHT KIDNEY AND URETER:  No urinary tract calculi   No hydronephrosis or hydroureter  Beto Zhu bulging of the inferior right lateral kidney, correlate with nonemergent outpatient renal ultrasound  LEFT KIDNEY AND URETER:  No urinary tract calculi   No hydronephrosis or hydroureter  URINARY BLADDER:   Unremarkable  Small hiatal hernia  Left adrenal adenoma  Limited low radiation dose noncontrast CT evaluation demonstrates no clinically significant abnormality of liver, spleen, pancreas, or adrenal glands  No calcified gallstones or gallbladder wall thickening noted  No ascites or bulky lymphadenopathy on this limited noncontrast study  Colonic diverticula are noted, without evidence to suggest acute diverticulitis   Visualized bowel appears otherwise unremarkable  Limited evaluation demonstrates no evidence to suggest acute appendicitis  No acute fracture or destructive osseous lesion is identified  Degenerative changes to the lumbar spine is identified including multilevel neural foraminal narrowing     Left gluteal stimulator device is identified entering the left L3 sacral foramen and terminating medial to the left piriformis muscle  Impression:     1   Colonic diverticulosis  2   Left adrenal adenoma  3   Small hiatal hernia  4   Mild bulging of the inferior right lateral kidney, correlate with nonemergent outpatient renal ultrasound   This could be due to normal contour lobulation  Correlate with urinalysis

## 2018-08-25 LAB — BACTERIA UR CULT: ABNORMAL

## 2018-08-25 PROCEDURE — 99232 SBSQ HOSP IP/OBS MODERATE 35: CPT | Performed by: INTERNAL MEDICINE

## 2018-08-25 PROCEDURE — 99233 SBSQ HOSP IP/OBS HIGH 50: CPT | Performed by: INTERNAL MEDICINE

## 2018-08-25 RX ADMIN — ENOXAPARIN SODIUM 40 MG: 40 INJECTION SUBCUTANEOUS at 08:16

## 2018-08-25 RX ADMIN — ACETAMINOPHEN 650 MG: 325 TABLET, FILM COATED ORAL at 06:43

## 2018-08-25 RX ADMIN — TRAMADOL HYDROCHLORIDE 50 MG: 50 TABLET, FILM COATED ORAL at 08:16

## 2018-08-25 RX ADMIN — GABAPENTIN 300 MG: 300 CAPSULE ORAL at 17:33

## 2018-08-25 RX ADMIN — TIZANIDINE 2 MG: 2 TABLET ORAL at 10:52

## 2018-08-25 RX ADMIN — GABAPENTIN 300 MG: 300 CAPSULE ORAL at 08:16

## 2018-08-25 RX ADMIN — BUDESONIDE AND FORMOTEROL FUMARATE DIHYDRATE 1 PUFF: 80; 4.5 AEROSOL RESPIRATORY (INHALATION) at 08:16

## 2018-08-25 RX ADMIN — ERTAPENEM SODIUM 1000 MG: 1 INJECTION, POWDER, LYOPHILIZED, FOR SOLUTION INTRAMUSCULAR; INTRAVENOUS at 17:33

## 2018-08-25 RX ADMIN — DEXTRAN 70 AND HYPROMELLOSE 2910 1 DROP: 1; 3 SOLUTION/ DROPS OPHTHALMIC at 21:14

## 2018-08-25 RX ADMIN — ACETAMINOPHEN 650 MG: 325 TABLET, FILM COATED ORAL at 23:39

## 2018-08-25 RX ADMIN — TRAMADOL HYDROCHLORIDE 50 MG: 50 TABLET, FILM COATED ORAL at 17:33

## 2018-08-25 RX ADMIN — FLUTICASONE PROPIONATE 1 SPRAY: 50 SPRAY, METERED NASAL at 08:16

## 2018-08-25 RX ADMIN — VENLAFAXINE HYDROCHLORIDE 75 MG: 75 CAPSULE, EXTENDED RELEASE ORAL at 08:16

## 2018-08-25 RX ADMIN — PANTOPRAZOLE SODIUM 40 MG: 40 TABLET, DELAYED RELEASE ORAL at 06:44

## 2018-08-25 RX ADMIN — GABAPENTIN 300 MG: 300 CAPSULE ORAL at 21:14

## 2018-08-25 RX ADMIN — DEXTRAN 70 AND HYPROMELLOSE 2910 1 DROP: 1; 3 SOLUTION/ DROPS OPHTHALMIC at 08:16

## 2018-08-25 RX ADMIN — LEVOTHYROXINE SODIUM 25 MCG: 25 TABLET ORAL at 06:44

## 2018-08-25 RX ADMIN — LORATADINE 10 MG: 10 TABLET ORAL at 08:16

## 2018-08-25 NOTE — PROGRESS NOTES
Progress Note - Infectious Disease   Laz Medrano 69 y o  female MRN: [id number]  Unit/Bed#: [place] [address] -01 Encounter: [id number]      A/P:     1  ESBL E coli UTI  Recent urine cultures revealed greater than 100,000 ESBL E coli  Despite no abnormal urinary symptoms, patient has been having suprapubic and right flank discomfort  CT suggestive of mild bulging of the right kidney  No stone or evidence of obstruction  No associated fevers or chills  Although this is an atypical presentation for UTI, I think it merits a short course of treatment due to patient's significant pain and abnormal CT  Consideration that she may have passed a kidney stone  Fortunately, she remains without fevers, leukocytosis  Her pain is somewhat improving  At this point, I am not convinced she truly has an infection as she remains with no clear clinical evidence of infection  Patient's pain improved with a bowel movement, which would suggest against acute UTI      -continue IV ertapenem for now  Likely plan for short course of 5 days if patient continues to improve, through 8/26   -continue to monitor symptoms closely  -monitor fevers and WBC count     2  Right flank pain  May be secondary to #1  CT with no evidence of obstruction  Patient may have passed a stone as there is some mild bulging at the right kidney seen on CT  There was also blood present in her urine  Her pain is somewhat improved  As stated above, may be related to underlying IBS rather than urinary infection      -continue antibiotic as above  -monitor flank pain  -recommend repeat urinalysis as outpatient after completion of antibiotic to reassess for blood in urine     3  History of kidney stones  Consideration that patient may have passed a kidney stone  CT scan with no evidence of ongoing stone or obstruction      4  MDD/anxiety  5   IBS    With constipation      Discussed above plan with patient in detail, and with   Matthew Mccullough  Subjective:  Patient states she had some lower abdominal pain this morning which improved after she had a bowel movement  Denies any urinary complaints  No fevers or chills  Objective:  Vitals:  HR:  [69-72] 69  Resp:  [18] 18  BP: (144-181)/(72-83) 161/77  SpO2:  [97 %] 97 %  Temp (24hrs), Av 5 °F (36 4 °C), Min:97 4 °F (36 3 °C), Max:97 6 °F (36 4 °C)  Current: Temperature: 97 5 °F (36 4 °C)    Physical Exam:   General:  Well-nourished, well-developed, in no acute distress  Eyes:  Conjunctive clear with no hemorrhages or effusions  Oropharynx:  No ulcers, no lesions  Neck:  Supple, no lymphadenopathy  Lungs:  Clear to auscultation bilaterally, no accessory muscle use  Cardiac:  Regular rate and rhythm, no murmurs  Abdomen:  Soft, non-tender, non-distented  Extremities:  No peripheral cyanosis, clubbing, or edema  Skin:  No rashes, no ulcers  Neurological:  Moves all four extremities spontaneously, sensation grossly intact    I have personally reviewed pertinent labs  Results from last 7 days  Lab Units 18  1712 18  2207   SODIUM mmol/L 139 139   POTASSIUM mmol/L 3 9 3 4*   CHLORIDE mmol/L 101 104   CO2 mmol/L 28 29   ANION GAP mmol/L 10 6   BUN mg/dL 11 13   CREATININE mg/dL 0 66 0 74   EGFR ml/min/1 73sq m 90 83   GLUCOSE RANDOM mg/dL 85 120   CALCIUM mg/dL 9 1 8 9   AST U/L  --  20   ALT U/L  --  25   ALK PHOS U/L  --  114   TOTAL PROTEIN g/dL  --  7 4   BILIRUBIN TOTAL mg/dL  --  0 36       Results from last 7 days  Lab Units 18  1712 18  2207   WBC Thousand/uL 7 89 9 45   HEMOGLOBIN g/dL 13 0 12 8   PLATELETS Thousands/uL 265 250       Results from last 7 days  Lab Units 18  2257 18  1849   URINE CULTURE  >100,000 cfu/ml Escherichia coli ESBL* >100,000 cfu/ml Escherichia coli ESBL*       Imaging Studies:   I have personally reviewed pertinent imaging study reports and images in PACS      EKG, Pathology, and Other Studies:   I have personally reviewed pertinent reports

## 2018-08-25 NOTE — PLAN OF CARE
DISCHARGE PLANNING     Discharge to home or other facility with appropriate resources Progressing        GENITOURINARY - ADULT     Maintains or returns to baseline urinary function Progressing        INFECTION - ADULT     Absence or prevention of progression during hospitalization Progressing        Knowledge Deficit     Patient/family/caregiver demonstrates understanding of disease process, treatment plan, medications, and discharge instructions Progressing        PAIN - ADULT     Verbalizes/displays adequate comfort level or baseline comfort level Progressing        Potential for Falls     Patient will remain free of falls Progressing        SKIN/TISSUE INTEGRITY - ADULT     Skin integrity remains intact Progressing

## 2018-08-26 VITALS
SYSTOLIC BLOOD PRESSURE: 126 MMHG | DIASTOLIC BLOOD PRESSURE: 85 MMHG | RESPIRATION RATE: 18 BRPM | HEART RATE: 73 BPM | HEIGHT: 64 IN | TEMPERATURE: 98.6 F | BODY MASS INDEX: 39.95 KG/M2 | OXYGEN SATURATION: 96 % | WEIGHT: 234 LBS

## 2018-08-26 PROCEDURE — 99239 HOSP IP/OBS DSCHRG MGMT >30: CPT | Performed by: INTERNAL MEDICINE

## 2018-08-26 RX ADMIN — FLUTICASONE PROPIONATE 1 SPRAY: 50 SPRAY, METERED NASAL at 09:21

## 2018-08-26 RX ADMIN — BUDESONIDE AND FORMOTEROL FUMARATE DIHYDRATE 1 PUFF: 80; 4.5 AEROSOL RESPIRATORY (INHALATION) at 09:21

## 2018-08-26 RX ADMIN — GABAPENTIN 300 MG: 300 CAPSULE ORAL at 16:55

## 2018-08-26 RX ADMIN — GABAPENTIN 300 MG: 300 CAPSULE ORAL at 09:21

## 2018-08-26 RX ADMIN — LEVOTHYROXINE SODIUM 25 MCG: 25 TABLET ORAL at 06:07

## 2018-08-26 RX ADMIN — LORATADINE 10 MG: 10 TABLET ORAL at 09:21

## 2018-08-26 RX ADMIN — PANTOPRAZOLE SODIUM 40 MG: 40 TABLET, DELAYED RELEASE ORAL at 06:07

## 2018-08-26 RX ADMIN — DEXTRAN 70 AND HYPROMELLOSE 2910 1 DROP: 1; 3 SOLUTION/ DROPS OPHTHALMIC at 09:21

## 2018-08-26 RX ADMIN — VENLAFAXINE HYDROCHLORIDE 75 MG: 75 CAPSULE, EXTENDED RELEASE ORAL at 09:21

## 2018-08-26 RX ADMIN — TRAMADOL HYDROCHLORIDE 50 MG: 50 TABLET, FILM COATED ORAL at 06:07

## 2018-08-26 RX ADMIN — TIZANIDINE 2 MG: 2 TABLET ORAL at 09:21

## 2018-08-26 RX ADMIN — ERTAPENEM SODIUM 1000 MG: 1 INJECTION, POWDER, LYOPHILIZED, FOR SOLUTION INTRAMUSCULAR; INTRAVENOUS at 16:55

## 2018-08-26 RX ADMIN — ENOXAPARIN SODIUM 40 MG: 40 INJECTION SUBCUTANEOUS at 09:21

## 2018-08-26 NOTE — PROGRESS NOTES
Progress Note - Annette Lagos 69 y o  female MRN: [id number]    Unit/Bed#: [place] [facility] -01 Encounter: [id number]    Assessment/Plan:    ESBL E coli UTI  possible pyelo, reviewed infectious disease note will complete 5 days of ertapenem    Hypothyroidism  continue Synthroid    Neuropathy   pain controlled with Neurontin    Chronic back pain/spasm stable continue Neurontin and Zanaflex    MDD/anxiety   mood stable with Effexor and Klonopin    GERD    continue PPI for acid control    Subjective:   Feels good, flank pain resolved denies chest pain shortness of breath nausea vomiting diarrhea no fevers chills appetite is okay    Objective:     Vitals: Blood pressure 137/74, pulse 67, temperature 98 7 °F (37 1 °C), temperature source Tympanic, resp  rate 18, height 5' 4" (1 626 m), weight 106 kg (234 lb), SpO2 96 %  ,Body mass index is 40 17 kg/m²          Results from last 7 days  Lab Units 08/22/18  1712 08/21/18  2207   WBC Thousand/uL 7 89 9 45   HEMOGLOBIN g/dL 13 0 12 8   HEMATOCRIT % 39 6 38 3   PLATELETS Thousands/uL 265 250   INR   --  0 95       Results from last 7 days  Lab Units 08/22/18  1712 08/21/18  2207   SODIUM mmol/L 139 139   POTASSIUM mmol/L 3 9 3 4*   CHLORIDE mmol/L 101 104   CO2 mmol/L 28 29   BUN mg/dL 11 13   CREATININE mg/dL 0 66 0 74   CALCIUM mg/dL 9 1 8 9   TOTAL PROTEIN g/dL  --  7 4   BILIRUBIN TOTAL mg/dL  --  0 36   ALK PHOS U/L  --  114   ALT U/L  --  25   AST U/L  --  20   GLUCOSE RANDOM mg/dL 85 120       Scheduled Meds:    Current Facility-Administered Medications:  acetaminophen 650 mg Oral Q6H PRN Kamron Londono DO    budesonide-formoterol 1 puff Inhalation Daily Kamron Londono DO    clonazePAM 0 5 mg Oral BID PRN Kamron Londono DO    dextran 70-hypromellose 1 drop Both Eyes Q12H [address] [facility], DO    enoxaparin 40 mg Subcutaneous Q24H [facility] Kamron Londono DO    ertapenem 1,000 mg Intravenous Q24H Kamron Huntington, DO Last Rate: 1,000 mg (08/25/18 8802)   fluticasone 1 spray Each Nare Daily Hattie Chimes, DO    gabapentin 300 mg Oral TID Hattie Chimes, DO    hydrALAZINE 10 mg Intravenous Q6H PRN Hattie Chimes, DO    levothyroxine 25 mcg Oral Daily Before [address], DO    loratadine 10 mg Oral Daily Hattie Chimes, DO    pantoprazole 40 mg Oral Early Morning Hattie Chimes, DO    tiZANidine 2 mg Oral Daily Hattie Chimes, DO    traMADol 50 mg Oral Q6H PRN Hattie Chimes, DO    venlafaxine 75 mg Oral Daily Hattie Chimes, DO        Continuous Infusions:     Physical exam:  General appearance:  Alert no distress interaction appropriate   Head/Eyes:  Nonicteric PERRL EOMI  Neck:  Supple  Lungs: CTA bilateral no wheezing rhonchi or rales  Heart: normal S1 S2 regular  Abdomen:  Obese nontender with bowel sounds  Extremities: no edema  Skin: no rash    Invasive Devices     Peripheral Intravenous Line            Peripheral IV 08/22/18 Right Antecubital 3 days                      Counseling / Coordination of Care  Total floor / unit time spent today  30   minutes  Greater than 50% of total time was spent with the patient and / or family counseling and / or coordination of care    A description of the counseling / coordination of care:

## 2018-08-26 NOTE — DISCHARGE SUMMARY
Discharge Summary - Medical Alvenia Homme 69 y o  female MRN: [id number]    [address] MED SURG Room / Bed: [place] 2 /[facility] 2 M* Encounter: [id number]    BRIEF OVERVIEW    Admitting Provider: Barnett Hodgkin, DO  Discharge Provider: Barnett Hodgkin, DO  Admission Date: 8/22/2018     Discharge Date: No discharge date for patient encounter    Primary Care Physician at Discharge: Braulio Page [phone]    Primary Discharge Diagnosis  Drug resistant E coli/pyelonephritis    Other Problems Addressed  Patient Active Problem List    Diagnosis Date Noted    Pyelonephritis 08/22/2018    Acute right flank pain 08/21/2018    Chronic cough 08/01/2018    ALLA (obstructive sleep apnea) 05/22/2018    Gastroesophageal reflux disease without esophagitis 04/03/2018    Lyme disease 02/12/2018    Generalized anxiety disorder 08/04/2017    Pain, foot, chronic, right 02/22/2017    Malaise and fatigue 01/31/2017    Depression 01/30/2017    Hypertension, benign 01/30/2017    Hypothyroidism 01/30/2017    Migraine 01/30/2017    Mild intermittent asthma without complication 33/91/4148    Neuropathy 01/30/2017    Osteoarthritis 01/30/2017    Overactive bladder 01/30/2017    Palpitations 01/30/2017    Seasonal allergies 01/30/2017    DDD (degenerative disc disease), lumbar 03/30/2016    Lumbar facet arthropathy [address] [address] ) 03/30/2016    Post herpetic neuralgia 02/17/2016    Low back pain 09/10/2015       Consulting Providers   Infectious Disease    Discharge Disposition: home    Allergies  Allergies   Allergen Reactions    Sulfa Antibiotics Hives     swelling  swelling    Amoxicillin Hives    Augmentin Es-600  [Amoxicillin-Pot Clavulanate]     Bee Venom     Doxycycline Other (See Comments)     Diet restrictions:  None  Activity restrictions:  None  Discharge Condition:  Good    Outpatient Follow-Up  Dimas Madsen in 1 week  [address] [facility]    Presenting Problem/History of Present Illness  Pyelonephritis  Hospital Course  69year-old presents with flank pain and weakness  ESBL E coli UTI/pyelonephritis  patient with a history of chronic urinary tract infection, urine grew ESBL E coli  Ertapenem was initiated on admission, patient has a history of drug-resistant UTI  She was seen in consultation with Infectious Disease, and with no associated fevers or chills atypical presentation of short course of treatment 5 days was appropriate  This was completed the patient's symptoms resolved  Other medical conditions stable at discharge    Discharge  Statement   I spent 35 minutes discharging the patient  This time was spent on the day of discharge  I had direct contact with the patient on the day of discharge  Additional documentation is required if more than 30 minutes were spent on discharge  Review reviewed hospital course and discharge with patient    Discharge instructions/Information to patient and family:   See after visit summary for information provided to patient and family

## 2018-08-26 NOTE — PLAN OF CARE
DISCHARGE PLANNING     Discharge to home or other facility with appropriate resources Adequate for Discharge        DISCHARGE PLANNING - CARE MANAGEMENT     Discharge to post-acute care or home with appropriate resources Adequate for Discharge        GENITOURINARY - ADULT     Maintains or returns to baseline urinary function Adequate for Discharge        INFECTION - ADULT     Absence or prevention of progression during hospitalization Adequate for Discharge        Knowledge Deficit     Patient/family/caregiver demonstrates understanding of disease process, treatment plan, medications, and discharge instructions Adequate for Discharge        PAIN - ADULT     Verbalizes/displays adequate comfort level or baseline comfort level Adequate for Discharge        Potential for Falls     Patient will remain free of falls Adequate for Discharge        SKIN/TISSUE INTEGRITY - ADULT     Skin integrity remains intact Adequate for Discharge

## 2018-09-05 ENCOUNTER — OFFICE VISIT (OUTPATIENT)
Dept: FAMILY MEDICINE CLINIC | Facility: CLINIC | Age: 68
End: 2018-09-05
Payer: COMMERCIAL

## 2018-09-05 VITALS
WEIGHT: 235 LBS | SYSTOLIC BLOOD PRESSURE: 120 MMHG | TEMPERATURE: 97.6 F | BODY MASS INDEX: 40.12 KG/M2 | HEIGHT: 64 IN | OXYGEN SATURATION: 97 % | HEART RATE: 71 BPM | DIASTOLIC BLOOD PRESSURE: 80 MMHG | RESPIRATION RATE: 18 BRPM

## 2018-09-05 DIAGNOSIS — I10 HYPERTENSION, BENIGN: ICD-10-CM

## 2018-09-05 DIAGNOSIS — R05.3 CHRONIC COUGH: ICD-10-CM

## 2018-09-05 DIAGNOSIS — J45.20 MILD INTERMITTENT ASTHMA WITHOUT COMPLICATION: Primary | ICD-10-CM

## 2018-09-05 DIAGNOSIS — F41.1 GENERALIZED ANXIETY DISORDER: ICD-10-CM

## 2018-09-05 PROBLEM — R10.9 ACUTE RIGHT FLANK PAIN: Status: RESOLVED | Noted: 2018-08-21 | Resolved: 2018-09-05

## 2018-09-05 PROCEDURE — 99214 OFFICE O/P EST MOD 30 MIN: CPT | Performed by: PHYSICIAN ASSISTANT

## 2018-09-05 PROCEDURE — 1111F DSCHRG MED/CURRENT MED MERGE: CPT | Performed by: PHYSICIAN ASSISTANT

## 2018-09-05 PROCEDURE — 3079F DIAST BP 80-89 MM HG: CPT | Performed by: PHYSICIAN ASSISTANT

## 2018-09-05 PROCEDURE — 3074F SYST BP LT 130 MM HG: CPT | Performed by: PHYSICIAN ASSISTANT

## 2018-09-05 RX ORDER — VENLAFAXINE HYDROCHLORIDE 75 MG/1
CAPSULE, EXTENDED RELEASE ORAL
COMMUNITY
End: 2018-09-05 | Stop reason: SDUPTHER

## 2018-09-05 RX ORDER — ALBUTEROL SULFATE 90 UG/1
AEROSOL, METERED RESPIRATORY (INHALATION)
COMMUNITY
End: 2018-09-05 | Stop reason: SDUPTHER

## 2018-09-05 NOTE — PROGRESS NOTES
Assessment/Plan:    Mild intermittent asthma without complication  Well controlled continue Symbicort    Hypertension, benign  Stable despite discontinuing lisinopril    Chronic cough  Resolved since d/c lisinopril  No further w/u    Generalized anxiety disorder  Stable  Continue venlafaxine and prn klonopin       Diagnoses and all orders for this visit:    Mild intermittent asthma without complication    Hypertension, benign    Generalized anxiety disorder    Chronic cough    Other orders  -     Acetaminophen (TYLENOL) 325 MG CAPS; 1-2 Tablets  by Mouth PRN  -     Discontinue: albuterol (PROVENTIL HFA,VENTOLIN HFA) 90 mcg/act inhaler; Inhale 1 puff once daily   -     Discontinue: venlafaxine (EFFEXOR-XR) 75 mg 24 hr capsule; take 1 capsule by mouth once daily with food  -     Discontinue: Linaclotide (LINZESS) 145 MCG CAPS;           Subjective:      Patient ID: Trisha Sweeney is a 69 y o  female  75-year-old female presents for one-month follow-up of chronic cough  This has been ongoing for several months  Worse when laying flat with associated nasal congestion  Lisinopril was discontinued at last visit to see if this was playing a role  She has continued to take Allegra and Flonase for control of allergies as well as Prevacid for GERD  No weight loss, SOB, CP, hemoptysis  Using Symbicort as prescribed for asthma  She reports cough has completely resolved since about a week of d/c lisinopril  BP has remained stable at 1 20/80 and she denies chest pain, vision changes or headache  Patient has been doing well in regards to control of her depression anxiety despite the recent death of her daughter and the minimal support of her   She has been taking venlafaxine 75 mg as prescribed with as needed clonazepam   She has been keeping herself busy weeding her garden and she enjoys this    She is sleeping and eating well and denies suicidal ideation        The following portions of the patient's history were reviewed and updated as appropriate: allergies, current medications, past family history, past medical history, past social history, past surgical history and problem list     Review of Systems   Constitutional: Negative for diaphoresis, fatigue and fever  HENT: Negative for congestion, ear pain, hearing loss, postnasal drip, rhinorrhea and sore throat  Eyes: Negative for pain, redness and visual disturbance  Respiratory: Negative for cough, shortness of breath and wheezing  Cardiovascular: Negative for chest pain, palpitations and leg swelling  Gastrointestinal: Negative for anal bleeding, constipation, diarrhea, nausea and vomiting  Endocrine: Negative for polydipsia and polyuria  Genitourinary: Negative for dysuria, flank pain and hematuria  Musculoskeletal: Negative for arthralgias, back pain, joint swelling and myalgias  Skin: Negative for pallor, rash and wound  Neurological: Negative for dizziness, syncope, numbness and headaches  Hematological: Negative for adenopathy  Does not bruise/bleed easily  Psychiatric/Behavioral: Positive for dysphoric mood  Negative for decreased concentration, sleep disturbance and suicidal ideas  The patient is nervous/anxious  Objective:      /80   Pulse 71   Temp 97 6 °F (36 4 °C)   Resp 18   Ht 5' 4" (1 626 m)   Wt 107 kg (235 lb)   SpO2 97%   BMI 40 34 kg/m²          Physical Exam   Constitutional: She is oriented to person, place, and time  She appears well-developed and well-nourished  No distress  HENT:   Head: Normocephalic and atraumatic  Mouth/Throat: Oropharynx is clear and moist    Eyes: Conjunctivae and EOM are normal  Pupils are equal, round, and reactive to light  Right eye exhibits no discharge  Left eye exhibits no discharge  No scleral icterus  Neck: Normal range of motion  Neck supple  No thyromegaly present  Cardiovascular: Normal rate, regular rhythm and normal heart sounds    Exam reveals no gallop and no friction rub  No murmur heard  Pulmonary/Chest: Effort normal and breath sounds normal  No respiratory distress  She has no wheezes  She has no rales  Musculoskeletal: Normal range of motion  She exhibits no edema  Lymphadenopathy:     She has no cervical adenopathy  Neurological: She is alert and oriented to person, place, and time  No cranial nerve deficit  Skin: Skin is warm and dry  No rash noted  She is not diaphoretic  No erythema  No pallor

## 2018-09-07 PROBLEM — R05.3 CHRONIC COUGH: Status: RESOLVED | Noted: 2018-08-01 | Resolved: 2018-09-07

## 2018-09-07 PROBLEM — Z23 FLU VACCINE NEED: Status: ACTIVE | Noted: 2018-09-07

## 2018-09-07 PROBLEM — Z00.00 PREVENTATIVE HEALTH CARE: Status: ACTIVE | Noted: 2018-09-07

## 2018-09-07 PROBLEM — Z78.0 POSTMENOPAUSE: Status: ACTIVE | Noted: 2018-09-07

## 2018-09-24 DIAGNOSIS — F41.1 GENERALIZED ANXIETY DISORDER: ICD-10-CM

## 2018-09-24 RX ORDER — CLONAZEPAM 0.5 MG/1
0.5 TABLET ORAL 2 TIMES DAILY PRN
Qty: 30 TABLET | Refills: 0 | Status: SHIPPED | OUTPATIENT
Start: 2018-09-24 | End: 2018-11-21 | Stop reason: SDUPTHER

## 2018-10-28 DIAGNOSIS — K21.9 GASTROESOPHAGEAL REFLUX DISEASE WITHOUT ESOPHAGITIS: ICD-10-CM

## 2018-10-29 RX ORDER — LANSOPRAZOLE 30 MG
CAPSULE,DELAYED RELEASE (ENTERIC COATED) ORAL
Qty: 90 CAPSULE | Refills: 1 | Status: SHIPPED | OUTPATIENT
Start: 2018-10-29 | End: 2022-02-17 | Stop reason: ALTCHOICE

## 2018-10-30 ENCOUNTER — APPOINTMENT (RX ONLY)
Dept: URBAN - METROPOLITAN AREA CLINIC 89 | Facility: CLINIC | Age: 68
Setting detail: DERMATOLOGY
End: 2018-10-30

## 2018-10-30 DIAGNOSIS — L70.0 ACNE VULGARIS: ICD-10-CM

## 2018-10-30 PROCEDURE — ? ACNE SURGERY

## 2018-10-30 PROCEDURE — 10040 EXTRACTION: CPT

## 2018-10-30 ASSESSMENT — LOCATION SIMPLE DESCRIPTION DERM
LOCATION SIMPLE: RIGHT CHEEK
LOCATION SIMPLE: LEFT CHEEK
LOCATION SIMPLE: LEFT FOREHEAD
LOCATION SIMPLE: CHIN
LOCATION SIMPLE: NOSE

## 2018-10-30 ASSESSMENT — LOCATION DETAILED DESCRIPTION DERM
LOCATION DETAILED: LEFT INFERIOR MEDIAL FOREHEAD
LOCATION DETAILED: LEFT INFERIOR MEDIAL MALAR CHEEK
LOCATION DETAILED: RIGHT CENTRAL MALAR CHEEK
LOCATION DETAILED: NASAL DORSUM
LOCATION DETAILED: RIGHT CHIN

## 2018-10-30 ASSESSMENT — LOCATION ZONE DERM
LOCATION ZONE: FACE
LOCATION ZONE: NOSE

## 2018-10-30 NOTE — PROCEDURE: ACNE SURGERY
Detail Level: Zone
Render Number Of Lesions Treated: no
Acne Type: Milial cysts
Consent was obtained and risks were reviewed including but not limited to scarring, infection, bleeding, scabbing, incomplete removal, and allergy to anesthesia.
Post-Care Instructions: I reviewed with the patient in detail post-care instructions. Patient is to keep the treatment areas dry overnight, and then apply bacitracin twice daily until healed. Patient may apply hydrogen peroxide soaks to remove any crusting.
Hemostasis: Aluminum Chloride
Extraction Method: 11 blade
Prep Text (Optional): Prior to removal the treatment areas were prepped in the usual fashion.

## 2018-11-21 DIAGNOSIS — F41.1 GENERALIZED ANXIETY DISORDER: ICD-10-CM

## 2018-11-21 RX ORDER — CLONAZEPAM 0.5 MG/1
0.5 TABLET ORAL 2 TIMES DAILY PRN
Qty: 30 TABLET | Refills: 0 | Status: SHIPPED | OUTPATIENT
Start: 2018-11-21

## 2018-11-21 NOTE — TELEPHONE ENCOUNTER
Patient called stating that she is suffering from severe anxiety, she is unsure what the cause is currently  Patient would like a refill on her medication, and scheduled appointment for Monday   If staff meeting does not take place can we fit patient in at 4:30PM?

## 2018-11-25 DIAGNOSIS — K58.9 IRRITABLE BOWEL SYNDROME, UNSPECIFIED TYPE: ICD-10-CM

## 2018-11-25 RX ORDER — LINACLOTIDE 145 UG/1
CAPSULE, GELATIN COATED ORAL
Qty: 90 CAPSULE | Refills: 1 | Status: SHIPPED | OUTPATIENT
Start: 2018-11-25 | End: 2022-06-14

## 2018-11-26 ENCOUNTER — OFFICE VISIT (OUTPATIENT)
Dept: FAMILY MEDICINE CLINIC | Facility: CLINIC | Age: 68
End: 2018-11-26
Payer: COMMERCIAL

## 2018-11-26 VITALS
OXYGEN SATURATION: 98 % | DIASTOLIC BLOOD PRESSURE: 80 MMHG | BODY MASS INDEX: 40.8 KG/M2 | HEART RATE: 78 BPM | HEIGHT: 64 IN | TEMPERATURE: 98.8 F | RESPIRATION RATE: 18 BRPM | SYSTOLIC BLOOD PRESSURE: 140 MMHG | WEIGHT: 239 LBS

## 2018-11-26 DIAGNOSIS — F41.1 GENERALIZED ANXIETY DISORDER: Primary | ICD-10-CM

## 2018-11-26 PROCEDURE — 4040F PNEUMOC VAC/ADMIN/RCVD: CPT | Performed by: PHYSICIAN ASSISTANT

## 2018-11-26 PROCEDURE — 1160F RVW MEDS BY RX/DR IN RCRD: CPT | Performed by: PHYSICIAN ASSISTANT

## 2018-11-26 PROCEDURE — 1036F TOBACCO NON-USER: CPT | Performed by: PHYSICIAN ASSISTANT

## 2018-11-26 PROCEDURE — 3008F BODY MASS INDEX DOCD: CPT | Performed by: PHYSICIAN ASSISTANT

## 2018-11-26 PROCEDURE — 99214 OFFICE O/P EST MOD 30 MIN: CPT | Performed by: PHYSICIAN ASSISTANT

## 2018-11-26 RX ORDER — BUPROPION HYDROCHLORIDE 150 MG/1
150 TABLET ORAL DAILY
Qty: 30 TABLET | Refills: 0 | Status: SHIPPED | OUTPATIENT
Start: 2018-11-26 | End: 2018-11-26 | Stop reason: SDUPTHER

## 2018-11-26 RX ORDER — BUPROPION HYDROCHLORIDE 150 MG/1
150 TABLET ORAL DAILY
Qty: 90 TABLET | Refills: 0 | Status: SHIPPED | OUTPATIENT
Start: 2018-11-26

## 2018-11-26 NOTE — ASSESSMENT & PLAN NOTE
Overall very impressed with how patient has been handling her stressful situations, explained that I thought a lot of her reactions were natural and appropriate  Will add in wellbutrin 150 mg daily to regimen   F/u 3 months
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
## 2018-12-09 DIAGNOSIS — E03.9 ACQUIRED HYPOTHYROIDISM: ICD-10-CM

## 2018-12-10 RX ORDER — LEVOTHYROXINE SODIUM 25 MCG
TABLET ORAL
Qty: 90 TABLET | Refills: 1 | Status: SHIPPED | OUTPATIENT
Start: 2018-12-10

## 2018-12-12 DIAGNOSIS — J30.89 SEASONAL ALLERGIC RHINITIS DUE TO OTHER ALLERGIC TRIGGER: Primary | ICD-10-CM

## 2018-12-12 RX ORDER — FLUTICASONE PROPIONATE 50 MCG
2 SPRAY, SUSPENSION (ML) NASAL DAILY
Qty: 24 G | Refills: 5 | Status: SHIPPED | OUTPATIENT
Start: 2018-12-12 | End: 2022-06-14

## 2018-12-19 ENCOUNTER — OFFICE VISIT (OUTPATIENT)
Dept: FAMILY MEDICINE CLINIC | Facility: CLINIC | Age: 68
End: 2018-12-19
Payer: COMMERCIAL

## 2018-12-19 VITALS
SYSTOLIC BLOOD PRESSURE: 130 MMHG | HEART RATE: 93 BPM | TEMPERATURE: 98.8 F | DIASTOLIC BLOOD PRESSURE: 70 MMHG | WEIGHT: 236 LBS | BODY MASS INDEX: 40.29 KG/M2 | OXYGEN SATURATION: 97 % | HEIGHT: 64 IN

## 2018-12-19 DIAGNOSIS — F41.1 GENERALIZED ANXIETY DISORDER: Primary | ICD-10-CM

## 2018-12-19 PROCEDURE — 1036F TOBACCO NON-USER: CPT | Performed by: PHYSICIAN ASSISTANT

## 2018-12-19 PROCEDURE — 3008F BODY MASS INDEX DOCD: CPT | Performed by: PHYSICIAN ASSISTANT

## 2018-12-19 PROCEDURE — 1160F RVW MEDS BY RX/DR IN RCRD: CPT | Performed by: PHYSICIAN ASSISTANT

## 2018-12-19 PROCEDURE — 99213 OFFICE O/P EST LOW 20 MIN: CPT | Performed by: PHYSICIAN ASSISTANT

## 2018-12-19 RX ORDER — VENLAFAXINE HYDROCHLORIDE 150 MG/1
150 CAPSULE, EXTENDED RELEASE ORAL DAILY
Qty: 90 CAPSULE | Refills: 1
Start: 2018-12-19 | End: 2018-12-19 | Stop reason: SDUPTHER

## 2018-12-19 RX ORDER — VENLAFAXINE HYDROCHLORIDE 150 MG/1
150 CAPSULE, EXTENDED RELEASE ORAL DAILY
Qty: 90 CAPSULE | Refills: 1 | Status: SHIPPED | OUTPATIENT
Start: 2018-12-19 | End: 2022-06-14
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
## 2018-12-19 NOTE — ASSESSMENT & PLAN NOTE
After reviewing patient's dosing instructions, it was found that she has not been taking effexor 150 mg daily which was working well for her    About three weeks ago started taking 1 75 mg tab daily to "space it out" before her refill arrived  Once she received the refill forgot she was supposed to be taking 2 per day  Sent in 150 mg tabs which she will take once daily from now on to avoid this from reoccurring  I am also referring her to the [address] [place] [address] in [place] for therapy and psychiatry evaluation   F/u here in 3 months

## 2019-01-01 DIAGNOSIS — M19.90 OSTEOARTHRITIS, UNSPECIFIED OSTEOARTHRITIS TYPE, UNSPECIFIED SITE: Primary | ICD-10-CM

## 2019-01-02 RX ORDER — IBUPROFEN 600 MG/1
TABLET ORAL
Qty: 180 TABLET | Refills: 3 | Status: SHIPPED | OUTPATIENT
Start: 2019-01-02

## 2019-02-13 ENCOUNTER — OFFICE VISIT (OUTPATIENT)
Dept: FAMILY MEDICINE CLINIC | Facility: CLINIC | Age: 69
End: 2019-02-13
Payer: COMMERCIAL

## 2019-02-13 VITALS
OXYGEN SATURATION: 98 % | TEMPERATURE: 98.3 F | RESPIRATION RATE: 20 BRPM | SYSTOLIC BLOOD PRESSURE: 160 MMHG | HEART RATE: 100 BPM | BODY MASS INDEX: 41.15 KG/M2 | HEIGHT: 64 IN | WEIGHT: 241 LBS | DIASTOLIC BLOOD PRESSURE: 90 MMHG

## 2019-02-13 DIAGNOSIS — B00.9 HERPES SIMPLEX: Primary | ICD-10-CM

## 2019-02-13 PROCEDURE — 3008F BODY MASS INDEX DOCD: CPT | Performed by: PHYSICIAN ASSISTANT

## 2019-02-13 PROCEDURE — 1160F RVW MEDS BY RX/DR IN RCRD: CPT | Performed by: PHYSICIAN ASSISTANT

## 2019-02-13 PROCEDURE — 99212 OFFICE O/P EST SF 10 MIN: CPT | Performed by: PHYSICIAN ASSISTANT

## 2019-02-13 RX ORDER — ERYTHROMYCIN 5 MG/G
OINTMENT OPHTHALMIC
COMMUNITY
End: 2019-03-04 | Stop reason: ALTCHOICE

## 2019-02-13 RX ORDER — VALACYCLOVIR HYDROCHLORIDE 1 G/1
TABLET, FILM COATED ORAL
Qty: 30 TABLET | Refills: 0 | Status: SHIPPED | OUTPATIENT
Start: 2019-02-13 | End: 2022-06-14

## 2019-02-13 NOTE — PROGRESS NOTES
Assessment/Plan:    Herpes simplex  Valtrex 2000 mg now and repeat in 12 hours  Instructed to repeat this at first sign of reoccurrence in the future       Diagnoses and all orders for this visit:    Herpes simplex  -     valACYclovir (VALTREX) 1,000 mg tablet; Take 2 tabs PO now and repeat in 12 hours  Take at first sign of reoccurrence  Other orders  -     erythromycin (ILOTYCIN) ophthalmic ointment; erythromycin 5 mg/gram (0 5 %) eye ointment   apply A THIN LAYER ON EYELID three times a day          Subjective:      Patient ID: Jessica Solis is a 77 y o  female  55-year-old female presents complaining of blisters on the upper lip for week and a half  Reports prior to onset there was some numbness and tingling in the area  This was followed by painful blisters  It is only on the outer lip, not on the inner mucosa  Still eating and drinking well  Reports low-grade fevers  States she has has similar reactions in the past   She has not tried anything for relief      The following portions of the patient's history were reviewed and updated as appropriate: allergies, current medications, past family history, past medical history and problem list     Review of Systems   Constitutional: Positive for fever  Skin: Positive for rash  Objective:      /90 (BP Location: Left arm, Patient Position: Sitting, Cuff Size: Large)   Pulse 100   Temp 98 3 °F (36 8 °C) (Tympanic)   Resp 20   Ht 5' 4" (1 626 m)   Wt 109 kg (241 lb)   SpO2 98%   BMI 41 37 kg/m²          Physical Exam   Constitutional: She appears well-developed and well-nourished  No distress  HENT:   Mouth/Throat:       Skin: She is not diaphoretic

## 2019-02-13 NOTE — ASSESSMENT & PLAN NOTE
Valtrex 2000 mg now and repeat in 12 hours   Instructed to repeat this at first sign of reoccurrence in the future

## 2019-03-04 ENCOUNTER — OFFICE VISIT (OUTPATIENT)
Dept: FAMILY MEDICINE CLINIC | Facility: CLINIC | Age: 69
End: 2019-03-04
Payer: COMMERCIAL

## 2019-03-04 VITALS
SYSTOLIC BLOOD PRESSURE: 130 MMHG | HEART RATE: 68 BPM | TEMPERATURE: 98.7 F | WEIGHT: 248 LBS | DIASTOLIC BLOOD PRESSURE: 80 MMHG | OXYGEN SATURATION: 98 % | HEIGHT: 64 IN | BODY MASS INDEX: 42.34 KG/M2

## 2019-03-04 DIAGNOSIS — Z78.0 POSTMENOPAUSE: ICD-10-CM

## 2019-03-04 DIAGNOSIS — Z13.820 SCREENING FOR OSTEOPOROSIS: ICD-10-CM

## 2019-03-04 DIAGNOSIS — J45.20 MILD INTERMITTENT ASTHMA WITHOUT COMPLICATION: ICD-10-CM

## 2019-03-04 DIAGNOSIS — F41.1 GENERALIZED ANXIETY DISORDER: ICD-10-CM

## 2019-03-04 DIAGNOSIS — E03.9 ACQUIRED HYPOTHYROIDISM: ICD-10-CM

## 2019-03-04 DIAGNOSIS — Z00.00 MEDICARE ANNUAL WELLNESS VISIT, SUBSEQUENT: Primary | ICD-10-CM

## 2019-03-04 DIAGNOSIS — G47.33 OSA (OBSTRUCTIVE SLEEP APNEA): ICD-10-CM

## 2019-03-04 PROBLEM — N12 PYELONEPHRITIS: Status: RESOLVED | Noted: 2018-08-22 | Resolved: 2019-03-04

## 2019-03-04 PROBLEM — I10 HYPERTENSION, BENIGN: Status: RESOLVED | Noted: 2017-01-30 | Resolved: 2019-03-04

## 2019-03-04 PROCEDURE — 1170F FXNL STATUS ASSESSED: CPT | Performed by: PHYSICIAN ASSISTANT

## 2019-03-04 PROCEDURE — 99214 OFFICE O/P EST MOD 30 MIN: CPT | Performed by: PHYSICIAN ASSISTANT

## 2019-03-04 PROCEDURE — G0439 PPPS, SUBSEQ VISIT: HCPCS | Performed by: PHYSICIAN ASSISTANT

## 2019-03-04 PROCEDURE — 1125F AMNT PAIN NOTED PAIN PRSNT: CPT | Performed by: PHYSICIAN ASSISTANT

## 2019-03-04 NOTE — ASSESSMENT & PLAN NOTE
Script given for fasting labs as well as DEXA- pt is doing a lot of walking and supplementing with calcium and vit d  UTD mammo, CRC screening, immunizations, advance directive planning

## 2019-03-04 NOTE — ASSESSMENT & PLAN NOTE
Currently very well controlled on Effexor 150 mg daily as well as Wellbutrin 150 mg daily with as needed Klonopin  Continue current management    Follow-up in 6 months

## 2019-03-04 NOTE — PROGRESS NOTES
Assessment/Plan:    Medicare annual wellness visit, subsequent  Script given for fasting labs as well as DEXA- pt is doing a lot of walking and supplementing with calcium and vit d  UTD mammo, CRC screening, immunizations, advance directive planning    Generalized anxiety disorder  Currently very well controlled on Effexor 150 mg daily as well as Wellbutrin 150 mg daily with as needed Klonopin  Continue current management  Follow-up in 6 months    Hypothyroidism  Feeling well on Synthroid 25 mcg  TSH ordered    Mild intermittent asthma without complication  Well controlled on Symbicort daily, has not needed to use rescue inhaler  ALLA (obstructive sleep apnea)  Compliant and doing well with CPAP       Diagnoses and all orders for this visit:    Medicare annual wellness visit, subsequent    Acquired hypothyroidism  -     CBC and differential; Future  -     Comprehensive metabolic panel; Future  -     Lipid Panel with Direct LDL reflex; Future  -     TSH, 3rd generation with Free T4 reflex; Future    Mild intermittent asthma without complication    Generalized anxiety disorder    Postmenopause  -     DXA bone density spine hip and pelvis; Future    Screening for osteoporosis  -     DXA bone density spine hip and pelvis; Future    ALLA (obstructive sleep apnea)          Subjective:      Patient ID: Lidia Ahumada is a 77 y o  female  52-year-old female presents for follow-up of anxiety, hypothyroidism, asthma, sleep apnea  At last visit patient was having significant depression anxiety  We discover that she was not taking her medication correctly  She has been since taking Effexor 150 mg as well as Wellbutrin 150 mg daily as prescribed  She reports mood is significantly improved  She is doing well at her current job at [employer] [facility] [employer] [facility] [employer] [facility] [employer]  She has good energy, eating well, sleeping well, denies suicidal ideation  Her relationship with her  has been improving    PHQ-9 score today four  Hypothyroidism is well controlled on Synthroid 25 mcg daily  Has gained some weight but attributes this to working night shift  No change in bowel habits or mood  Due for TSH  Asthma has been well controlled on Symbicort which she uses daily  Has not had to use albuterol in quite a while  Sleep apnea has been well controlled with CPAP  Reports good compliance  The following portions of the patient's history were reviewed and updated as appropriate: allergies, current medications, past family history, past medical history, past social history, past surgical history and problem list     Review of Systems   Constitutional: Positive for unexpected weight change  Negative for diaphoresis, fatigue and fever  HENT: Negative for congestion, ear pain, hearing loss, postnasal drip, rhinorrhea and sore throat  Eyes: Negative for pain, redness and visual disturbance  Respiratory: Negative for cough, shortness of breath and wheezing  Cardiovascular: Negative for chest pain, palpitations and leg swelling  Gastrointestinal: Negative for anal bleeding, constipation, diarrhea, nausea and vomiting  Endocrine: Negative for polydipsia and polyuria  Genitourinary: Negative for dysuria, flank pain and hematuria  Musculoskeletal: Negative for arthralgias, back pain, joint swelling and myalgias  Skin: Negative for pallor, rash and wound  Neurological: Negative for dizziness, syncope, numbness and headaches  Hematological: Negative for adenopathy  Does not bruise/bleed easily  Psychiatric/Behavioral: Negative for dysphoric mood, sleep disturbance and suicidal ideas  The patient is nervous/anxious  Objective:      /80 (BP Location: Right arm, Patient Position: Sitting, Cuff Size: Large)   Pulse 68   Temp 98 7 °F (37 1 °C)   Ht 5' 4" (1 626 m)   Wt 112 kg (248 lb)   SpO2 98%   BMI 42 57 kg/m²          Physical Exam   Constitutional: She is oriented to person, place, and time   She appears well-developed and well-nourished  No distress  HENT:   Head: Normocephalic and atraumatic  Mouth/Throat: Oropharynx is clear and moist    Eyes: Pupils are equal, round, and reactive to light  Conjunctivae and EOM are normal  Right eye exhibits no discharge  Left eye exhibits no discharge  No scleral icterus  Neck: Normal range of motion  Neck supple  No thyromegaly present  Cardiovascular: Normal rate, regular rhythm and normal heart sounds  Exam reveals no gallop and no friction rub  No murmur heard  Pulmonary/Chest: Effort normal and breath sounds normal  No respiratory distress  She has no wheezes  She has no rales  Musculoskeletal: Normal range of motion  She exhibits no edema  Lymphadenopathy:     She has no cervical adenopathy  Neurological: She is alert and oriented to person, place, and time  No cranial nerve deficit  Skin: Skin is warm and dry  No rash noted  She is not diaphoretic  No erythema  No pallor  Psychiatric: She has a normal mood and affect   Her behavior is normal

## 2019-03-04 NOTE — PROGRESS NOTES
Assessment and Plan:    Problem List Items Addressed This Visit        Endocrine    Hypothyroidism     Feeling well on Synthroid 25 mcg  TSH ordered         Relevant Orders    CBC and differential    Comprehensive metabolic panel    Lipid Panel with Direct LDL reflex    TSH, 3rd generation with Free T4 reflex       Respiratory    Mild intermittent asthma without complication     Well controlled on Symbicort daily, has not needed to use rescue inhaler  ALLA (obstructive sleep apnea)     Compliant and doing well with CPAP            Other    Generalized anxiety disorder     Currently very well controlled on Effexor 150 mg daily as well as Wellbutrin 150 mg daily with as needed Klonopin  Continue current management  Follow-up in 6 months         Postmenopause    Relevant Orders    DXA bone density spine hip and pelvis    Medicare annual wellness visit, subsequent - Primary     Script given for fasting labs as well as DEXA- pt is doing a lot of walking and supplementing with calcium and vit d  UTD mammo, CRC screening, immunizations, advance directive planning         Screening for osteoporosis    Relevant Orders    DXA bone density spine hip and pelvis        Health Maintenance Due   Topic Date Due    Medicare Annual Wellness Visit (AWV)  1950    BMI: Followup Plan  11/23/1966    DXA SCAN  07/27/2018    Pneumococcal PPSV23/PCV13 65+ Years / High and Highest Risk (2 of 2 - PPSV23) 09/07/2018         HPI:  Blake Jenkins is a 77 y o  female here for her Subsequent Wellness Visit      Patient Active Problem List   Diagnosis    DDD (degenerative disc disease), lumbar    Depression    Generalized anxiety disorder    Hypothyroidism    Low back pain    Lumbar facet arthropathy    Malaise and fatigue    Migraine    Mild intermittent asthma without complication    Neuropathy    Osteoarthritis    Overactive bladder    Pain, foot, chronic, right    Palpitations    Post herpetic neuralgia    Seasonal allergies    Lyme disease    Gastroesophageal reflux disease without esophagitis    ALLA (obstructive sleep apnea)    Flu vaccine need    Postmenopause    Herpes simplex    Medicare annual wellness visit, subsequent    Screening for osteoporosis     Past Medical History:   Diagnosis Date    Anxiety     Arthritis     Carpal tunnel syndrome     Cytomegalovirus infection ([address] )     Depression     Disease of thyroid gland     Diverticulosis     Esophageal reflux     GERD (gastroesophageal reflux disease)     Glaucoma     Herpes zoster     Interstitial cystitis     Kidney stones, calcium oxalate     Lumbar disc disorder     Malignant neoplasm of skin     Obesity     Sleep apnea      Past Surgical History:   Procedure Laterality Date    BLADDER EXTROPHY RECONSTRUCTION PELVIC SAGITTAL OSTEOTOMY      BREAST LUMPECTOMY      NEUROPLASTY / TRANSPOSITION MEDIAN NERVE AT CARPAL TUNNEL      REPLACEMENT TOTAL KNEE Bilateral     SINUS SURGERY      TOTAL ABDOMINAL HYSTERECTOMY       Family History   Problem Relation Age of Onset    Coronary artery disease Mother     Other Mother         domestic violence of adult    Hyperlipidemia Mother     Coronary artery disease Father     Coronary artery disease Maternal Grandmother     Diabetes Maternal Grandmother      Social History     Tobacco Use   Smoking Status Never Smoker   Smokeless Tobacco Never Used     Social History     Substance and Sexual Activity   Alcohol Use No      Social History     Substance and Sexual Activity   Drug Use No       Current Outpatient Medications   Medication Sig Dispense Refill    Acetaminophen (TYLENOL) 325 MG CAPS 1-2 Tablets  by Mouth PRN      albuterol (PROAIR HFA) 90 mcg/act inhaler Inhale 1 puff once daily        baclofen 10 mg tablet       budesonide-formoterol (SYMBICORT) 80-4 5 MCG/ACT inhaler Inhale 1 puff daily      buPROPion (WELLBUTRIN XL) 150 mg 24 hr tablet Take 1 tablet (150 mg total) by mouth daily 90 tablet 0    Calcium Carbonate-Vitamin D (CALCIUM 600+D) 600-200 MG-UNIT TABS Take 1 tablet by mouth daily      clonazePAM (KlonoPIN) 0 5 mg tablet Take 1 tablet (0 5 mg total) by mouth 2 (two) times a day as needed for anxiety 30 tablet 0    cycloSPORINE (RESTASIS) 0 05 % ophthalmic emulsion Instill 1 drop in each eye twice daily   eletriptan (RELPAX) 40 MG tablet Take 1 tablet (40 mg total) by mouth once as needed (as needed) for up to 1 dose 10 tablet 9    EPINEPHrine (EPIPEN) 0 3 mg/0 3 mL SOAJ Inject into the shoulder, thigh, or buttocks      fluticasone (FLONASE) 50 mcg/act nasal spray 2 sprays into each nostril daily 24 g 5    gabapentin (NEURONTIN) 300 mg capsule Take 1 capsule by mouth 3 (three) times a day       MG tablet TAKE 1 TABLET THREE TIMES A DAY AS NEEDED FOR MILD PAIN 180 tablet 3    LINZESS 145 MCG CAPS TAKE 1 CAPSULE DAILY 90 capsule 1    ondansetron (ZOFRAN) 4 mg tablet Take 1 tablet (4 mg total) by mouth every 6 (six) hours as needed (as needed) 20 tablet 5    PREVACID 30 MG capsule TAKE 1 CAPSULE DAILY 90 capsule 1    SYNTHROID 25 MCG tablet TAKE 1 TABLET DAILY 90 tablet 1    TiZANidine (ZANAFLEX) 2 MG capsule Take 2 mg by mouth daily  0    venlafaxine (EFFEXOR-XR) 150 mg 24 hr capsule Take 1 capsule (150 mg total) by mouth daily 90 capsule 1    valACYclovir (VALTREX) 1,000 mg tablet Take 2 tabs PO now and repeat in 12 hours  Take at first sign of reoccurrence  30 tablet 0     No current facility-administered medications for this visit        Allergies   Allergen Reactions    Eletriptan Cough    Sulfa Antibiotics Hives     swelling  swelling    Amoxicillin Hives    Augmentin Es-600  [Amoxicillin-Pot Clavulanate]     Bee Venom     Doxycycline Other (See Comments)    Other      Fire ants     Immunization History   Administered Date(s) Administered    INFLUENZA 10/11/2011, 10/24/2012, 09/06/2017, 09/06/2017, 10/20/2018    Influenza TIV (IM) 10/13/2016  Pneumococcal Conjugate 13-Valent 2018    Pneumococcal Polysaccharide PPV23 2003, 2009    Td (adult), adsorbed 2004    Tdap 2013    Tuberculin Skin Test-PPD Intradermal 2018, 2018    influenza, trivalent, adjuvanted 10/20/2018       Patient Care Team:  Edis Ross PA-C as PCP - General (Family Medicine)    Medicare Screening Tests and Risk Assessments:      Health Risk Assessment:  Patient rates overall health as good  Patient feels that their physical health rating is Same  Eyesight was rated as Same  Hearing was rated as Same  Patient feels that their emotional and mental health rating is Same  Pain experienced by patient in the last 7 days has been None  Emotional/Mental Health:  Patient has been feeling nervous/anxious  PHQ-9 Depression Screening:    Frequency of the following problems over the past two weeks:      1  Little interest or pleasure in doing things: 0 - not at all      2  Feeling down, depressed, or hopeless: 1 - several days      3  Trouble falling or staying asleep, or sleeping too much: 0 - not at all      4  Feeling tired or having little energy: 3 - nearly every day      5  Poor appetite or overeatin - not at all      6  Feeling bad about yourself - or that you are a failure or have let yourself or your family down: 0 - not at all      7  Trouble concentrating on things, such as reading the newspaper or watching television: 0 - not at all      8  Moving or speaking so slowly that other people could have noticed  Or the opposite - being so fidgety or restless that you have been moving around a lot more than usual: 0 - not at all      9  Thoughts that you would be better off dead, or of hurting yourself in some way: 0 - not at all  PHQ-2 Score: 1  PHQ-9 Score: 4    Broken Bones/Falls:     Fall Risk Assessment:    In the past year, patient has experienced: No history of falling in past year          Bladder/Bowel:  Patient has not leaked urine accidently in the last six months  Patient reports no loss of bowel control  Immunizations:  Patient has had a flu vaccination within the last year  Patient has not received a pneumonia shot  Patient has not received a shingles shot  Patient has received tetanus/diphtheria shot  Home Safety:  Patient does not have trouble with stairs inside or outside of their home  Patient currently reports that there are no safety hazards present in home, working smoke alarms, working carbon monoxide detectors  Preventative Screenings:   Breast cancer screening performed, colon cancer screen completed, cholesterol screen completed, glaucoma eye exam completed,     Nutrition:  Current diet: Regular with servings of the following:    Medications:  Patient is not currently taking any over-the-counter supplements  Patient is able to manage medications  Lifestyle Choices:  Patient reports no tobacco use  Patient has not smoked or used tobacco in the past   Patient reports no alcohol use  Patient drives a vehicle  Patient wears seat belt  Activities of Daily Living:  Can get out of bed by his or her self, able to dress self, able to make own meals, able to do own shopping, able to bathe self, can do own laundry/housekeeping, can manage own money, pay bills and track expenses    Previous Hospitalizations:  No hospitalization or ED visit in past 12 months        Advanced Directives:  Patient has decided on a power of   Patient has spoken to designated power of   Patient has completed advanced directive          Preventative Screening/Counseling:      Cardiovascular:      Counseling: Healthy Diet, Healthy Weight and Improve Exercise Tolerance     Due for Labs/Analytes/Optional EKG: Lipid Panel          Diabetes:      General: Screening Current          Colorectal Cancer:      General: Screening Current          Breast Cancer:      General: Screening Current          Cervical Cancer:      General: Screening Not Indicated          Osteoporosis:      General: Risks and Benefits Discussed      Counseling: Calcium and Vitamin D Intake and Regular Weightbearing Exercise      Due for studies: DXA Axial          AAA:      General: Screening Not Indicated          Glaucoma:      General: Screening Current          HIV:      General: Screening Not Indicated          Hepatitis C:      General: Risks and Benefits Discussed        Advanced Directives:   Patient has living will for healthcare, has durable POA for healthcare, patient has an advanced directive  Information on ACP and/or AD provided  5 wishes given  End of life assessment reviewed with patient  Provider agrees with end of life decisions

## 2019-03-12 ENCOUNTER — TRANSCRIBE ORDERS (OUTPATIENT)
Dept: ADMINISTRATIVE | Facility: HOSPITAL | Age: 69
End: 2019-03-12

## 2019-03-12 DIAGNOSIS — N64.4 PAIN OF LEFT BREAST: Primary | ICD-10-CM

## 2019-03-14 ENCOUNTER — HOSPITAL ENCOUNTER (OUTPATIENT)
Dept: MAMMOGRAPHY | Facility: CLINIC | Age: 69
Discharge: HOME | End: 2019-03-14
Payer: COMMERCIAL

## 2019-03-14 ENCOUNTER — HOSPITAL ENCOUNTER (OUTPATIENT)
Dept: ULTRASOUND IMAGING | Facility: CLINIC | Age: 69
Discharge: HOME | End: 2019-03-14
Payer: COMMERCIAL

## 2019-03-14 VITALS — WEIGHT: 248 LBS | BODY MASS INDEX: 42.34 KG/M2 | HEIGHT: 64 IN

## 2019-03-14 DIAGNOSIS — N64.4 BREAST PAIN: ICD-10-CM

## 2019-03-14 DIAGNOSIS — N64.4 PAIN OF LEFT BREAST: ICD-10-CM

## 2019-03-14 PROCEDURE — G0279 TOMOSYNTHESIS, MAMMO: HCPCS

## 2019-03-14 PROCEDURE — 76642 ULTRASOUND BREAST LIMITED: CPT

## 2019-03-14 PROCEDURE — 77065 DX MAMMO INCL CAD UNI: CPT

## 2019-04-27 DIAGNOSIS — K21.9 GASTROESOPHAGEAL REFLUX DISEASE WITHOUT ESOPHAGITIS: ICD-10-CM

## 2019-05-01 RX ORDER — LANSOPRAZOLE 30 MG
CAPSULE,DELAYED RELEASE (ENTERIC COATED) ORAL
Qty: 90 CAPSULE | Refills: 1 | OUTPATIENT
Start: 2019-05-01

## 2019-05-17 ENCOUNTER — APPOINTMENT (RX ONLY)
Dept: URBAN - METROPOLITAN AREA CLINIC 89 | Facility: CLINIC | Age: 69
Setting detail: DERMATOLOGY
End: 2019-05-17

## 2019-05-17 DIAGNOSIS — L70.0 ACNE VULGARIS: ICD-10-CM

## 2019-05-17 PROCEDURE — ? OTHER

## 2019-05-17 PROCEDURE — 10040 EXTRACTION: CPT

## 2019-05-17 PROCEDURE — ? ACNE SURGERY

## 2019-05-17 ASSESSMENT — LOCATION SIMPLE DESCRIPTION DERM
LOCATION SIMPLE: RIGHT ANTERIOR NECK
LOCATION SIMPLE: INFERIOR FOREHEAD
LOCATION SIMPLE: NOSE
LOCATION SIMPLE: RIGHT CHEEK
LOCATION SIMPLE: LEFT CHEEK
LOCATION SIMPLE: LEFT LIP

## 2019-05-17 ASSESSMENT — LOCATION ZONE DERM
LOCATION ZONE: NOSE
LOCATION ZONE: LIP
LOCATION ZONE: FACE
LOCATION ZONE: NECK

## 2019-05-17 ASSESSMENT — LOCATION DETAILED DESCRIPTION DERM
LOCATION DETAILED: LEFT CENTRAL MALAR CHEEK
LOCATION DETAILED: NASAL ROOT
LOCATION DETAILED: RIGHT SUPERIOR ANTERIOR NECK
LOCATION DETAILED: LEFT LOWER CUTANEOUS LIP
LOCATION DETAILED: INFERIOR MID FOREHEAD
LOCATION DETAILED: RIGHT CENTRAL MALAR CHEEK

## 2019-05-17 NOTE — PROCEDURE: OTHER
Detail Level: Zone
Note Text (......Xxx Chief Complaint.): This diagnosis correlates with the
Other (Free Text): Dr. Shahram bullockze an ISK on her neck

## 2019-05-17 NOTE — PROCEDURE: ACNE SURGERY
Render Number Of Lesions Treated: no
Prep Text (Optional): Prior to removal the treatment areas were prepped in the usual fashion.
Acne Type: Comedonal Lesions
Consent was obtained and risks were reviewed including but not limited to scarring, infection, bleeding, scabbing, incomplete removal, and allergy to anesthesia.
Extraction Method: 11 blade
Post-Care Instructions: I reviewed with the patient in detail post-care instructions. Patient is to keep the treatment areas dry overnight, and then apply bacitracin twice daily until healed. Patient may apply hydrogen peroxide soaks to remove any crusting.
Hemostasis: Aluminum Chloride
Detail Level: Zone

## 2019-09-16 ENCOUNTER — APPOINTMENT (RX ONLY)
Dept: URBAN - METROPOLITAN AREA CLINIC 89 | Facility: CLINIC | Age: 69
Setting detail: DERMATOLOGY
End: 2019-09-16

## 2019-09-16 DIAGNOSIS — D22 MELANOCYTIC NEVI: ICD-10-CM

## 2019-09-16 DIAGNOSIS — L82.1 OTHER SEBORRHEIC KERATOSIS: ICD-10-CM

## 2019-09-16 DIAGNOSIS — L30.9 DERMATITIS, UNSPECIFIED: ICD-10-CM

## 2019-09-16 DIAGNOSIS — D18.0 HEMANGIOMA: ICD-10-CM

## 2019-09-16 PROBLEM — D22.5 MELANOCYTIC NEVI OF TRUNK: Status: ACTIVE | Noted: 2019-09-16

## 2019-09-16 PROBLEM — D18.01 HEMANGIOMA OF SKIN AND SUBCUTANEOUS TISSUE: Status: ACTIVE | Noted: 2019-09-16

## 2019-09-16 PROCEDURE — 99213 OFFICE O/P EST LOW 20 MIN: CPT

## 2019-09-16 PROCEDURE — ? COUNSELING

## 2019-09-16 PROCEDURE — ? TREATMENT REGIMEN

## 2019-09-16 ASSESSMENT — LOCATION DETAILED DESCRIPTION DERM
LOCATION DETAILED: EPIGASTRIC SKIN
LOCATION DETAILED: RIGHT MEDIAL UPPER BACK
LOCATION DETAILED: LEFT PROXIMAL PRETIBIAL REGION
LOCATION DETAILED: MIDDLE STERNUM
LOCATION DETAILED: LEFT SUPERIOR UPPER BACK

## 2019-09-16 ASSESSMENT — LOCATION SIMPLE DESCRIPTION DERM
LOCATION SIMPLE: CHEST
LOCATION SIMPLE: LEFT UPPER BACK
LOCATION SIMPLE: RIGHT UPPER BACK
LOCATION SIMPLE: ABDOMEN
LOCATION SIMPLE: LEFT PRETIBIAL REGION

## 2019-09-16 ASSESSMENT — LOCATION ZONE DERM
LOCATION ZONE: LEG
LOCATION ZONE: TRUNK

## 2019-09-16 NOTE — PROCEDURE: TREATMENT REGIMEN
Detail Level: Zone
Samples Given: Bryhali samples bid
Initiate Treatment: Bryhali bid x 2-3 weeks\\nCeraVe bid

## 2019-11-14 ENCOUNTER — APPOINTMENT (RX ONLY)
Dept: URBAN - METROPOLITAN AREA CLINIC 89 | Facility: CLINIC | Age: 69
Setting detail: DERMATOLOGY
End: 2019-11-14

## 2019-11-14 DIAGNOSIS — L70.0 ACNE VULGARIS: ICD-10-CM

## 2019-11-14 PROCEDURE — ? ACNE SURGERY

## 2019-11-14 PROCEDURE — 10040 EXTRACTION: CPT

## 2019-11-14 ASSESSMENT — LOCATION SIMPLE DESCRIPTION DERM
LOCATION SIMPLE: LEFT LIP
LOCATION SIMPLE: LEFT CHEEK
LOCATION SIMPLE: INFERIOR FOREHEAD
LOCATION SIMPLE: RIGHT CHEEK
LOCATION SIMPLE: NOSE

## 2019-11-14 ASSESSMENT — LOCATION DETAILED DESCRIPTION DERM
LOCATION DETAILED: INFERIOR MID FOREHEAD
LOCATION DETAILED: LEFT INFERIOR MEDIAL MALAR CHEEK
LOCATION DETAILED: LEFT LOWER CUTANEOUS LIP
LOCATION DETAILED: RIGHT INFERIOR CENTRAL MALAR CHEEK
LOCATION DETAILED: NASAL DORSUM

## 2019-11-14 ASSESSMENT — LOCATION ZONE DERM
LOCATION ZONE: FACE
LOCATION ZONE: NOSE
LOCATION ZONE: LIP

## 2019-11-14 NOTE — PROCEDURE: ACNE SURGERY
Detail Level: Zone
Render Number Of Lesions Treated: no
Post-Care Instructions: I reviewed with the patient in detail post-care instructions. Patient is to keep the treatment areas dry overnight, and then apply bacitracin twice daily until healed. Patient may apply hydrogen peroxide soaks to remove any crusting.
Acne Type: Milial cysts
Consent was obtained and risks were reviewed including but not limited to scarring, infection, bleeding, scabbing, incomplete removal, and allergy to anesthesia.
Prep Text (Optional): Prior to removal the treatment areas were prepped in the usual fashion.
Extraction Method: 11 blade

## 2020-08-19 ENCOUNTER — HOSPITAL ENCOUNTER (OUTPATIENT)
Dept: MAMMOGRAPHY | Facility: HOSPITAL | Age: 70
Discharge: HOME | End: 2020-08-19
Payer: COMMERCIAL

## 2020-08-19 VITALS — BODY MASS INDEX: 42.34 KG/M2 | HEIGHT: 64 IN | WEIGHT: 248 LBS

## 2020-08-19 DIAGNOSIS — Z12.39 SCREENING BREAST EXAMINATION: ICD-10-CM

## 2020-08-19 PROCEDURE — 77063 BREAST TOMOSYNTHESIS BI: CPT

## 2020-08-19 PROCEDURE — 77067 SCR MAMMO BI INCL CAD: CPT

## 2020-09-01 ENCOUNTER — HOSPITAL ENCOUNTER (OUTPATIENT)
Dept: MAMMOGRAPHY | Facility: CLINIC | Age: 70
Discharge: HOME | End: 2020-09-01
Payer: COMMERCIAL

## 2020-09-01 ENCOUNTER — HOSPITAL ENCOUNTER (OUTPATIENT)
Dept: ULTRASOUND IMAGING | Facility: CLINIC | Age: 70
Discharge: HOME | End: 2020-09-01
Payer: COMMERCIAL

## 2020-09-01 VITALS — HEIGHT: 64 IN | TEMPERATURE: 95.1 F | WEIGHT: 248 LBS | BODY MASS INDEX: 42.34 KG/M2

## 2020-09-01 DIAGNOSIS — R92.8 ABNORMAL MAMMOGRAM: ICD-10-CM

## 2020-09-01 PROCEDURE — 77065 DX MAMMO INCL CAD UNI: CPT

## 2020-09-01 PROCEDURE — G0279 TOMOSYNTHESIS, MAMMO: HCPCS

## 2020-09-01 PROCEDURE — 76642 ULTRASOUND BREAST LIMITED: CPT

## 2020-12-14 ENCOUNTER — APPOINTMENT (RX ONLY)
Dept: URBAN - METROPOLITAN AREA CLINIC 89 | Facility: CLINIC | Age: 70
Setting detail: DERMATOLOGY
End: 2020-12-14

## 2020-12-14 DIAGNOSIS — L70.0 ACNE VULGARIS: ICD-10-CM

## 2020-12-14 PROCEDURE — ? ACNE SURGERY

## 2020-12-14 PROCEDURE — 10040 EXTRACTION: CPT

## 2020-12-14 ASSESSMENT — LOCATION DETAILED DESCRIPTION DERM
LOCATION DETAILED: RIGHT CENTRAL MALAR CHEEK
LOCATION DETAILED: LEFT CHIN
LOCATION DETAILED: NASAL SUPRATIP
LOCATION DETAILED: LEFT CENTRAL MALAR CHEEK
LOCATION DETAILED: RIGHT INFERIOR MEDIAL FOREHEAD

## 2020-12-14 ASSESSMENT — LOCATION SIMPLE DESCRIPTION DERM
LOCATION SIMPLE: CHIN
LOCATION SIMPLE: RIGHT FOREHEAD
LOCATION SIMPLE: RIGHT CHEEK
LOCATION SIMPLE: LEFT CHEEK
LOCATION SIMPLE: NOSE

## 2020-12-14 ASSESSMENT — LOCATION ZONE DERM
LOCATION ZONE: FACE
LOCATION ZONE: NOSE

## 2020-12-14 NOTE — HPI: PIMPLES (ACNE)
How Severe Is Your Acne?: mild
Is This A New Presentation, Or A Follow-Up?: Acne
Additional Comments (Use Complete Sentences): Acne facial #8

## 2020-12-14 NOTE — PROCEDURE: ACNE SURGERY
Render Number Of Lesions Treated: no
Acne Type: Milial cysts
Prep Text (Optional): Prior to removal the treatment areas were prepped in the usual fashion.
Detail Level: Detailed
Consent was obtained and risks were reviewed including but not limited to scarring, infection, bleeding, scabbing, incomplete removal, and allergy to anesthesia.
Extraction Method: 11 blade
Post-Care Instructions: I reviewed with the patient in detail post-care instructions. Patient is to keep the treatment areas dry overnight, and then apply bacitracin twice daily until healed. Patient may apply hydrogen peroxide soaks to remove any crusting.

## 2021-08-31 ENCOUNTER — HOSPITAL ENCOUNTER (OUTPATIENT)
Dept: MAMMOGRAPHY | Facility: MEDICAL CENTER | Age: 71
Discharge: HOME | End: 2021-08-31
Payer: COMMERCIAL

## 2021-08-31 VITALS — HEIGHT: 64 IN | WEIGHT: 242 LBS | BODY MASS INDEX: 41.32 KG/M2

## 2021-08-31 DIAGNOSIS — Z12.31 ENCOUNTER FOR SCREENING MAMMOGRAM FOR MALIGNANT NEOPLASM OF BREAST: ICD-10-CM

## 2021-08-31 PROCEDURE — 77063 BREAST TOMOSYNTHESIS BI: CPT

## 2021-08-31 PROCEDURE — 77067 SCR MAMMO BI INCL CAD: CPT

## 2021-09-07 ENCOUNTER — APPOINTMENT (RX ONLY)
Dept: URBAN - METROPOLITAN AREA CLINIC 89 | Facility: CLINIC | Age: 71
Setting detail: DERMATOLOGY
End: 2021-09-07

## 2021-09-07 DIAGNOSIS — L70.0 ACNE VULGARIS: ICD-10-CM

## 2021-09-07 PROCEDURE — 10040 EXTRACTION: CPT

## 2021-09-07 PROCEDURE — ? ACNE SURGERY

## 2021-09-07 ASSESSMENT — LOCATION SIMPLE DESCRIPTION DERM
LOCATION SIMPLE: RIGHT CHEEK
LOCATION SIMPLE: LEFT CHEEK
LOCATION SIMPLE: CHIN
LOCATION SIMPLE: RIGHT FOREHEAD
LOCATION SIMPLE: NOSE

## 2021-09-07 ASSESSMENT — LOCATION DETAILED DESCRIPTION DERM
LOCATION DETAILED: RIGHT CHIN
LOCATION DETAILED: RIGHT INFERIOR MEDIAL FOREHEAD
LOCATION DETAILED: LEFT CENTRAL MALAR CHEEK
LOCATION DETAILED: NASAL SUPRATIP
LOCATION DETAILED: RIGHT CENTRAL MALAR CHEEK

## 2021-09-07 ASSESSMENT — LOCATION ZONE DERM
LOCATION ZONE: NOSE
LOCATION ZONE: FACE

## 2021-09-07 NOTE — PROCEDURE: ACNE SURGERY
Prep Text (Optional): Prior to removal the treatment areas were prepped in the usual fashion.
Acne Type: Milial cysts
Render Number Of Lesions Treated: no
Detail Level: Detailed
Extraction Method: 11 blade
Consent was obtained and risks were reviewed including but not limited to scarring, infection, bleeding, scabbing, incomplete removal, and allergy to anesthesia.
Post-Care Instructions: I reviewed with the patient in detail post-care instructions. Patient is to keep the treatment areas dry overnight, and then apply bacitracin twice daily until healed. Patient may apply hydrogen peroxide soaks to remove any crusting.

## 2021-09-07 NOTE — HPI: PIMPLES (ACNE)
How Severe Is Your Acne?: mild
Is This A New Presentation, Or A Follow-Up?: Acne
Additional Comments (Use Complete Sentences): Acne facial #9

## 2021-12-06 ENCOUNTER — APPOINTMENT (RX ONLY)
Dept: URBAN - METROPOLITAN AREA CLINIC 89 | Facility: CLINIC | Age: 71
Setting detail: DERMATOLOGY
End: 2021-12-06

## 2021-12-06 DIAGNOSIS — L57.0 ACTINIC KERATOSIS: ICD-10-CM | Status: INADEQUATELY CONTROLLED

## 2021-12-06 DIAGNOSIS — L82.1 OTHER SEBORRHEIC KERATOSIS: ICD-10-CM | Status: WELL CONTROLLED

## 2021-12-06 DIAGNOSIS — D18.0 HEMANGIOMA: ICD-10-CM

## 2021-12-06 DIAGNOSIS — D22 MELANOCYTIC NEVI: ICD-10-CM | Status: WELL CONTROLLED

## 2021-12-06 PROBLEM — D22.5 MELANOCYTIC NEVI OF TRUNK: Status: ACTIVE | Noted: 2021-12-06

## 2021-12-06 PROBLEM — D18.01 HEMANGIOMA OF SKIN AND SUBCUTANEOUS TISSUE: Status: ACTIVE | Noted: 2021-12-06

## 2021-12-06 PROCEDURE — ? LIQUID NITROGEN

## 2021-12-06 PROCEDURE — 17000 DESTRUCT PREMALG LESION: CPT

## 2021-12-06 PROCEDURE — ? COUNSELING

## 2021-12-06 PROCEDURE — 99212 OFFICE O/P EST SF 10 MIN: CPT | Mod: 25

## 2021-12-06 ASSESSMENT — LOCATION SIMPLE DESCRIPTION DERM
LOCATION SIMPLE: ABDOMEN
LOCATION SIMPLE: RIGHT UPPER BACK
LOCATION SIMPLE: LEFT UPPER BACK
LOCATION SIMPLE: LEFT PRETIBIAL REGION
LOCATION SIMPLE: CHEST

## 2021-12-06 ASSESSMENT — LOCATION DETAILED DESCRIPTION DERM
LOCATION DETAILED: MIDDLE STERNUM
LOCATION DETAILED: EPIGASTRIC SKIN
LOCATION DETAILED: LEFT SUPERIOR UPPER BACK
LOCATION DETAILED: LEFT PROXIMAL PRETIBIAL REGION
LOCATION DETAILED: RIGHT MEDIAL UPPER BACK

## 2021-12-06 ASSESSMENT — LOCATION ZONE DERM
LOCATION ZONE: LEG
LOCATION ZONE: TRUNK

## 2021-12-06 NOTE — PROCEDURE: LIQUID NITROGEN
Consent: The patient's consent was obtained including but not limited to risks of crusting, scabbing, blistering, scarring, darker or lighter pigmentary change, recurrence, incomplete removal and infection.
Show Aperture Variable?: Yes
Duration Of Freeze Thaw-Cycle (Seconds): 0
Post-Care Instructions: I reviewed with the patient in detail post-care instructions. Patient is to wear sunprotection, and avoid picking at any of the treated lesions. Pt may apply Vaseline to crusted or scabbing areas.
Detail Level: Simple
Number Of Freeze-Thaw Cycles: 2 freeze-thaw cycles
Render Post-Care Instructions In Note?: no

## 2022-04-19 ENCOUNTER — APPOINTMENT (RX ONLY)
Dept: URBAN - METROPOLITAN AREA CLINIC 89 | Facility: CLINIC | Age: 72
Setting detail: DERMATOLOGY
End: 2022-04-19

## 2022-04-19 DIAGNOSIS — L70.0 ACNE VULGARIS: ICD-10-CM

## 2022-04-19 PROCEDURE — 10040 EXTRACTION: CPT

## 2022-04-19 PROCEDURE — ? ACNE SURGERY

## 2022-04-19 ASSESSMENT — LOCATION DETAILED DESCRIPTION DERM
LOCATION DETAILED: LEFT INFERIOR MEDIAL MALAR CHEEK
LOCATION DETAILED: RIGHT INFERIOR MEDIAL FOREHEAD
LOCATION DETAILED: RIGHT CENTRAL MALAR CHEEK
LOCATION DETAILED: NASAL DORSUM
LOCATION DETAILED: LEFT CHIN

## 2022-04-19 ASSESSMENT — LOCATION SIMPLE DESCRIPTION DERM
LOCATION SIMPLE: LEFT CHEEK
LOCATION SIMPLE: RIGHT FOREHEAD
LOCATION SIMPLE: NOSE
LOCATION SIMPLE: RIGHT CHEEK
LOCATION SIMPLE: CHIN

## 2022-04-19 ASSESSMENT — LOCATION ZONE DERM
LOCATION ZONE: NOSE
LOCATION ZONE: FACE

## 2022-04-19 NOTE — PROCEDURE: ACNE SURGERY
Post-Care Instructions: I reviewed with the patient in detail post-care instructions. Patient is to keep the treatment areas dry overnight, and then apply bacitracin twice daily until healed. Patient may apply hydrogen peroxide soaks to remove any crusting.
Render Number Of Lesions Treated: no
Detail Level: Zone
Prep Text (Optional): Prior to removal the treatment areas were prepped in the usual fashion.
Consent was obtained and risks were reviewed including but not limited to scarring, infection, bleeding, scabbing, incomplete removal, and allergy to anesthesia.
Acne Type: Milial cysts
Extraction Method: 11 blade

## 2022-06-10 RX ORDER — SODIUM CHLORIDE 9 MG/ML
125 INJECTION, SOLUTION INTRAVENOUS CONTINUOUS
Status: CANCELLED | OUTPATIENT
Start: 2022-06-10

## 2022-06-14 ENCOUNTER — ANESTHESIA EVENT (OUTPATIENT)
Dept: GASTROENTEROLOGY | Facility: HOSPITAL | Age: 72
End: 2022-06-14

## 2022-06-14 ENCOUNTER — ANESTHESIA (OUTPATIENT)
Dept: GASTROENTEROLOGY | Facility: HOSPITAL | Age: 72
End: 2022-06-14

## 2022-06-14 ENCOUNTER — HOSPITAL ENCOUNTER (OUTPATIENT)
Dept: GASTROENTEROLOGY | Facility: HOSPITAL | Age: 72
Setting detail: OUTPATIENT SURGERY
Discharge: HOME | End: 2022-06-14
Attending: INTERNAL MEDICINE | Admitting: INTERNAL MEDICINE
Payer: COMMERCIAL

## 2022-06-14 VITALS
OXYGEN SATURATION: 97 % | RESPIRATION RATE: 18 BRPM | TEMPERATURE: 98.7 F | DIASTOLIC BLOOD PRESSURE: 56 MMHG | SYSTOLIC BLOOD PRESSURE: 111 MMHG | BODY MASS INDEX: 40.97 KG/M2 | HEIGHT: 64 IN | HEART RATE: 59 BPM | WEIGHT: 240 LBS

## 2022-06-14 DIAGNOSIS — R07.9 CHEST PAIN, UNSPECIFIED TYPE: ICD-10-CM

## 2022-06-14 DIAGNOSIS — K21.9 GASTROESOPHAGEAL REFLUX DISEASE, UNSPECIFIED WHETHER ESOPHAGITIS PRESENT: ICD-10-CM

## 2022-06-14 DIAGNOSIS — R13.19 ESOPHAGEAL DYSPHAGIA: ICD-10-CM

## 2022-06-14 PROBLEM — E66.01 MORBIDLY OBESE (HCC): Status: ACTIVE | Noted: 2022-06-14

## 2022-06-14 PROCEDURE — 43450 DILATE ESOPHAGUS 1/MULT PASS: CPT | Performed by: INTERNAL MEDICINE

## 2022-06-14 PROCEDURE — 43235 EGD DIAGNOSTIC BRUSH WASH: CPT | Performed by: INTERNAL MEDICINE

## 2022-06-14 RX ORDER — LIDOCAINE HYDROCHLORIDE 20 MG/ML
INJECTION, SOLUTION EPIDURAL; INFILTRATION; INTRACAUDAL; PERINEURAL AS NEEDED
Status: DISCONTINUED | OUTPATIENT
Start: 2022-06-14 | End: 2022-06-14

## 2022-06-14 RX ORDER — PROPOFOL 10 MG/ML
INJECTION, EMULSION INTRAVENOUS AS NEEDED
Status: DISCONTINUED | OUTPATIENT
Start: 2022-06-14 | End: 2022-06-14

## 2022-06-14 RX ORDER — SODIUM CHLORIDE 9 MG/ML
125 INJECTION, SOLUTION INTRAVENOUS CONTINUOUS
Status: DISCONTINUED | OUTPATIENT
Start: 2022-06-14 | End: 2022-06-18 | Stop reason: HOSPADM

## 2022-06-14 RX ADMIN — PROPOFOL 100 MG: 10 INJECTION, EMULSION INTRAVENOUS at 09:12

## 2022-06-14 RX ADMIN — PROPOFOL 200 MG: 10 INJECTION, EMULSION INTRAVENOUS at 09:06

## 2022-06-14 RX ADMIN — SODIUM CHLORIDE 125 ML/HR: 0.9 INJECTION, SOLUTION INTRAVENOUS at 08:08

## 2022-06-14 RX ADMIN — LIDOCAINE HYDROCHLORIDE 100 MG: 20 INJECTION, SOLUTION EPIDURAL; INFILTRATION; INTRACAUDAL; PERINEURAL at 09:06

## 2022-06-14 NOTE — ANESTHESIA PREPROCEDURE EVALUATION
Procedure:  EGD    Relevant Problems   CARDIO   (+) Migraine      ENDO   (+) Hypothyroidism      GI/HEPATIC   (+) Gastroesophageal reflux disease without esophagitis      MUSCULOSKELETAL   (+) DDD (degenerative disc disease), lumbar   (+) Low back pain   (+) Osteoarthritis      NEURO/PSYCH   (+) Depression   (+) Generalized anxiety disorder   (+) Migraine   (+) Pain, foot, chronic, right      PULMONARY   (+) Mild intermittent asthma without complication   (+) ALLA (obstructive sleep apnea)      Other   (+) Morbidly obese (HCC)        Physical Exam    Airway    Mallampati score: III  TM Distance: >3 FB  Neck ROM: full     Dental       Cardiovascular  Rhythm: regular, Rate: normal, Cardiovascular exam normal    Pulmonary  Pulmonary exam normal Breath sounds clear to auscultation,     Other Findings        Anesthesia Plan  ASA Score- 3     Anesthesia Type- general with ASA Monitors  Additional Monitors:   Airway Plan:           Plan Factors-Exercise tolerance (METS): >4 METS  Chart reviewed  Existing labs reviewed  Patient is not a current smoker  Obstructive sleep apnea risk education given perioperatively  Induction- intravenous  Postoperative Plan-     Informed Consent- Anesthetic plan and risks discussed with patient

## 2022-06-14 NOTE — ANESTHESIA POSTPROCEDURE EVALUATION
Post-Op Assessment Note    CV Status:  Stable  Pain Score: 0    Pain management: adequate     Mental Status:  Alert and awake   Hydration Status:  Euvolemic and stable   PONV Controlled:  Controlled   Airway Patency:  Patent      Post Op Vitals Reviewed: Yes      Staff: Anesthesiologist         No complications documented      BP      Temp      Pulse     Resp      SpO2      /56   Pulse 59   Temp 98 7 °F (37 1 °C) (Tympanic)   Resp 18   Ht 5' 4" (1 626 m)   Wt 109 kg (240 lb)   SpO2 97%   BMI 41 20 kg/m²

## 2022-10-11 ENCOUNTER — APPOINTMENT (RX ONLY)
Dept: URBAN - METROPOLITAN AREA CLINIC 89 | Facility: CLINIC | Age: 72
Setting detail: DERMATOLOGY
End: 2022-10-11

## 2022-10-11 DIAGNOSIS — S00522A BLISTER OF FACE, NECK, AND SCALP EXCEPT EYE, WITHOUT MENTION OF INFECTION: ICD-10-CM

## 2022-10-11 DIAGNOSIS — S0092XA BLISTER OF FACE, NECK, AND SCALP EXCEPT EYE, WITHOUT MENTION OF INFECTION: ICD-10-CM

## 2022-10-11 DIAGNOSIS — S0032XA BLISTER OF FACE, NECK, AND SCALP EXCEPT EYE, WITHOUT MENTION OF INFECTION: ICD-10-CM

## 2022-10-11 DIAGNOSIS — L259 CONTACT DERMATITIS AND OTHER ECZEMA, UNSPECIFIED CAUSE: ICD-10-CM

## 2022-10-11 DIAGNOSIS — S1092XA BLISTER OF FACE, NECK, AND SCALP EXCEPT EYE, WITHOUT MENTION OF INFECTION: ICD-10-CM

## 2022-10-11 DIAGNOSIS — S00521A BLISTER OF FACE, NECK, AND SCALP EXCEPT EYE, WITHOUT MENTION OF INFECTION: ICD-10-CM

## 2022-10-11 DIAGNOSIS — S00429A BLISTER OF FACE, NECK, AND SCALP EXCEPT EYE, WITHOUT MENTION OF INFECTION: ICD-10-CM

## 2022-10-11 DIAGNOSIS — S0002XA BLISTER OF FACE, NECK, AND SCALP EXCEPT EYE, WITHOUT MENTION OF INFECTION: ICD-10-CM

## 2022-10-11 DIAGNOSIS — S1012XA BLISTER OF FACE, NECK, AND SCALP EXCEPT EYE, WITHOUT MENTION OF INFECTION: ICD-10-CM

## 2022-10-11 PROBLEM — L30.9 DERMATITIS, UNSPECIFIED: Status: ACTIVE | Noted: 2022-10-11

## 2022-10-11 PROBLEM — L23.9 ALLERGIC CONTACT DERMATITIS, UNSPECIFIED CAUSE: Status: ACTIVE | Noted: 2022-10-11

## 2022-10-11 PROCEDURE — 99213 OFFICE O/P EST LOW 20 MIN: CPT

## 2022-10-11 PROCEDURE — ? COUNSELING

## 2022-10-11 PROCEDURE — ? PRESCRIPTION

## 2022-10-11 PROCEDURE — ? ADDITIONAL NOTES

## 2022-10-11 RX ORDER — MUPIROCIN 20 MG/G
1 OINTMENT TOPICAL BID
Qty: 22 | Refills: 2 | Status: ERX | COMMUNITY
Start: 2022-10-11

## 2022-10-11 RX ADMIN — MUPIROCIN 1: 20 OINTMENT TOPICAL at 00:00

## 2022-10-11 ASSESSMENT — LOCATION ZONE DERM
LOCATION ZONE: LEG
LOCATION ZONE: NECK
LOCATION ZONE: EYELID
LOCATION ZONE: LEG
LOCATION ZONE: FACE

## 2022-10-11 ASSESSMENT — LOCATION SIMPLE DESCRIPTION DERM
LOCATION SIMPLE: RIGHT CHEEK
LOCATION SIMPLE: CHIN
LOCATION SIMPLE: LEFT EYEBROW
LOCATION SIMPLE: RIGHT SUPERIOR EYELID
LOCATION SIMPLE: LEFT ACHILLES SKIN
LOCATION SIMPLE: LEFT ACHILLES SKIN
LOCATION SIMPLE: RIGHT ANTERIOR NECK

## 2022-10-11 ASSESSMENT — LOCATION DETAILED DESCRIPTION DERM
LOCATION DETAILED: LEFT ACHILLES SKIN
LOCATION DETAILED: RIGHT INFERIOR CENTRAL MALAR CHEEK
LOCATION DETAILED: RIGHT LATERAL SUPERIOR EYELID
LOCATION DETAILED: LEFT CENTRAL EYEBROW
LOCATION DETAILED: LEFT CHIN
LOCATION DETAILED: RIGHT SUPERIOR ANTERIOR NECK
LOCATION DETAILED: LEFT ACHILLES SKIN

## 2022-10-11 NOTE — HPI: EVALUATION OF SKIN LESION(S)
What Type Of Note Output Would You Prefer (Optional)?: Standard Output
How Severe Are Your Spot(S)?: mild
Have Your Spot(S) Been Treated In The Past?: has not been treated
Hpi Title: Evaluation of Skin Lesions
Additional History: Patient uses CeraVe facial cleanser, CeraVe PM, CeraVe intensive hydrating moisturizer on the skin in the area. She uses Differin, but not applied in these sites. She previously was using baby shampoo on her eyelids at the direction of her eye doctor prior to the rash starting, but stopped about 3 weeks ago. She does use perfumes and paints her nails. She has a history of multiple environmental allergies in the past and sees an allergist.

## 2022-10-11 NOTE — PROCEDURE: ADDITIONAL NOTES
Render Risk Assessment In Note?: no
Detail Level: Zone
Additional Notes: Patient advised to stop Differin, perfumes, nail polish, and red yeast. Advised to used dove for body wash. Recommended to continue cerave night moisturizer. Discussed possible patch testing.

## 2022-10-11 NOTE — HPI: SECONDARY COMPLAINT
How Severe Is This Condition?: mild
Additional History: Blister started about a week ago and occurred after bug bites in the area. She saw urgent care yesterday who prescribed her doxycycline which she took 1 tablet. She has never had similar rash before. It is mildly itchy in the area.

## 2022-10-11 NOTE — PROCEDURE: COUNSELING
Detail Level: Detailed
Patient Specific Counseling (Will Not Stick From Patient To Patient): Lanced today with an 11 blade and bandaged. Keep blister room intact. Apply mupirocin ointment twice daily to affected area. If blister reforms, may mila with sterile needle at home. Call if new blisters develop or this lesion does not heal after several weeks.
Detail Level: Generalized
Patient Specific Counseling (Will Not Stick From Patient To Patient): Stop all fragranced products including perfume. Stop nail polish. May use CeraVe cleanser and moisturizer (PM). Stop Differin for now. Discussed patch testing briefly. Will recheck in 3 weeks. \\n\\nPatient has home supply of hydrocortisone 2.5% ointment. Recommend she apply this twice daily for 1-2 weeks until clear.

## 2022-11-02 ENCOUNTER — HOSPITAL ENCOUNTER (OUTPATIENT)
Dept: MAMMOGRAPHY | Facility: MEDICAL CENTER | Age: 72
Discharge: HOME | End: 2022-11-02

## 2022-11-02 VITALS — WEIGHT: 240 LBS | BODY MASS INDEX: 40.97 KG/M2 | HEIGHT: 64 IN

## 2022-11-02 DIAGNOSIS — Z12.31 ENCOUNTER FOR SCREENING MAMMOGRAM FOR MALIGNANT NEOPLASM OF BREAST: ICD-10-CM

## 2022-11-11 ENCOUNTER — APPOINTMENT (RX ONLY)
Dept: URBAN - METROPOLITAN AREA CLINIC 89 | Facility: CLINIC | Age: 72
Setting detail: DERMATOLOGY
End: 2022-11-11

## 2022-11-11 DIAGNOSIS — L81.0 POSTINFLAMMATORY HYPERPIGMENTATION: ICD-10-CM

## 2022-11-11 DIAGNOSIS — L259 CONTACT DERMATITIS AND OTHER ECZEMA, UNSPECIFIED CAUSE: ICD-10-CM | Status: IMPROVED

## 2022-11-11 PROBLEM — L23.9 ALLERGIC CONTACT DERMATITIS, UNSPECIFIED CAUSE: Status: ACTIVE | Noted: 2022-11-11

## 2022-11-11 PROCEDURE — ? ADDITIONAL NOTES

## 2022-11-11 PROCEDURE — 99213 OFFICE O/P EST LOW 20 MIN: CPT

## 2022-11-11 PROCEDURE — ? COUNSELING

## 2022-11-11 ASSESSMENT — LOCATION DETAILED DESCRIPTION DERM
LOCATION DETAILED: MEDIAL FRONTAL SCALP
LOCATION DETAILED: RIGHT LATERAL FOREHEAD
LOCATION DETAILED: LEFT LATERAL DISTAL CALF
LOCATION DETAILED: LEFT LATERAL FOREHEAD
LOCATION DETAILED: LEFT CHIN

## 2022-11-11 ASSESSMENT — LOCATION ZONE DERM
LOCATION ZONE: SCALP
LOCATION ZONE: LEG
LOCATION ZONE: FACE

## 2022-11-11 ASSESSMENT — LOCATION SIMPLE DESCRIPTION DERM
LOCATION SIMPLE: RIGHT FOREHEAD
LOCATION SIMPLE: CHIN
LOCATION SIMPLE: LEFT FOREHEAD
LOCATION SIMPLE: LEFT LOWER LEG
LOCATION SIMPLE: FRONTAL SCALP

## 2022-11-11 NOTE — HPI: SECONDARY COMPLAINT
Additional History: At the patient’s last visit, she had a bullae present on the left distal calf. The site has healed with the topical mupirocin ointment that was prescribed nightly. She notices pigmentation changes at the site, but otherwise the site is asympatomic and she has not had recurrence of the bullae.

## 2022-11-11 NOTE — PROCEDURE: COUNSELING
Detail Level: Generalized
Patient Specific Counseling (Will Not Stick From Patient To Patient): Stop all fragranced products including perfume.Continue to hold nail polish use. Continue to hold Differin (may apply to nose only if pt desires).\\n\\nPatient has home supply of hydrocortisone 2.5% cream. Recommend she apply this twice daily for 1-2 weeks until clear again. \\n\\n Apply vanicream light or cetaphil PM as a night cream. Discontinue Carla shampoo, begin using Free & Clear Shampoo.\\n\\nDiscussed with patient that if rash continues to be recurrent, may need to pursue patch testing.
Detail Level: Detailed

## 2022-12-09 ENCOUNTER — APPOINTMENT (RX ONLY)
Dept: URBAN - METROPOLITAN AREA CLINIC 89 | Facility: CLINIC | Age: 72
Setting detail: DERMATOLOGY
End: 2022-12-09

## 2022-12-09 DIAGNOSIS — L70.0 ACNE VULGARIS: ICD-10-CM

## 2022-12-09 PROCEDURE — 10040 EXTRACTION: CPT

## 2022-12-09 PROCEDURE — ? ACNE SURGERY

## 2022-12-09 ASSESSMENT — LOCATION SIMPLE DESCRIPTION DERM
LOCATION SIMPLE: CHIN
LOCATION SIMPLE: NOSE
LOCATION SIMPLE: RIGHT CHEEK
LOCATION SIMPLE: RIGHT FOREHEAD
LOCATION SIMPLE: LEFT CHEEK

## 2022-12-09 ASSESSMENT — LOCATION ZONE DERM
LOCATION ZONE: NOSE
LOCATION ZONE: FACE

## 2022-12-09 ASSESSMENT — LOCATION DETAILED DESCRIPTION DERM
LOCATION DETAILED: LEFT INFERIOR MEDIAL MALAR CHEEK
LOCATION DETAILED: RIGHT INFERIOR MEDIAL FOREHEAD
LOCATION DETAILED: RIGHT CENTRAL MALAR CHEEK
LOCATION DETAILED: LEFT CHIN
LOCATION DETAILED: NASAL DORSUM

## 2022-12-16 ENCOUNTER — APPOINTMENT (RX ONLY)
Dept: URBAN - METROPOLITAN AREA CLINIC 89 | Facility: CLINIC | Age: 72
Setting detail: DERMATOLOGY
End: 2022-12-16

## 2022-12-16 DIAGNOSIS — L24 IRRITANT CONTACT DERMATITIS: ICD-10-CM | Status: IMPROVED

## 2022-12-16 PROBLEM — L24.9 IRRITANT CONTACT DERMATITIS, UNSPECIFIED CAUSE: Status: ACTIVE | Noted: 2022-12-16

## 2022-12-16 PROCEDURE — 99212 OFFICE O/P EST SF 10 MIN: CPT | Mod: 24

## 2022-12-16 PROCEDURE — ? COUNSELING

## 2022-12-16 ASSESSMENT — LOCATION DETAILED DESCRIPTION DERM
LOCATION DETAILED: LEFT CENTRAL OCCIPITAL SCALP
LOCATION DETAILED: LEFT LATERAL FOREHEAD

## 2022-12-16 ASSESSMENT — LOCATION SIMPLE DESCRIPTION DERM
LOCATION SIMPLE: LEFT FOREHEAD
LOCATION SIMPLE: SCALP

## 2022-12-16 ASSESSMENT — LOCATION ZONE DERM
LOCATION ZONE: FACE
LOCATION ZONE: SCALP

## 2022-12-16 NOTE — PROCEDURE: COUNSELING
Detail Level: Detailed
Patient Specific Counseling (Will Not Stick From Patient To Patient): Stop use of the electric hair removal device. Advised patient to call if her dermatitis recurs in the future despite stopping use of this tool.Pt to call with phone call update in 2 weeks

## 2023-05-01 PROBLEM — Z98.890 HISTORY OF ESOPHAGOGASTRODUODENOSCOPY (EGD): Status: ACTIVE | Noted: 2022-06-14

## 2023-07-17 ENCOUNTER — APPOINTMENT (RX ONLY)
Dept: URBAN - METROPOLITAN AREA CLINIC 89 | Facility: CLINIC | Age: 73
Setting detail: DERMATOLOGY
End: 2023-07-17

## 2023-07-17 DIAGNOSIS — L70.0 ACNE VULGARIS: ICD-10-CM

## 2023-07-17 PROCEDURE — ? ACNE SURGERY

## 2023-07-17 PROCEDURE — 10040 EXTRACTION: CPT

## 2023-07-17 ASSESSMENT — LOCATION ZONE DERM
LOCATION ZONE: FACE
LOCATION ZONE: NOSE

## 2023-07-17 ASSESSMENT — LOCATION SIMPLE DESCRIPTION DERM
LOCATION SIMPLE: RIGHT CHEEK
LOCATION SIMPLE: LEFT CHEEK
LOCATION SIMPLE: RIGHT FOREHEAD
LOCATION SIMPLE: NOSE
LOCATION SIMPLE: CHIN

## 2023-07-17 ASSESSMENT — LOCATION DETAILED DESCRIPTION DERM
LOCATION DETAILED: RIGHT CENTRAL MALAR CHEEK
LOCATION DETAILED: LEFT INFERIOR MEDIAL MALAR CHEEK
LOCATION DETAILED: RIGHT INFERIOR MEDIAL FOREHEAD
LOCATION DETAILED: LEFT CHIN
LOCATION DETAILED: NASAL DORSUM

## 2023-08-04 ENCOUNTER — APPOINTMENT (RX ONLY)
Dept: URBAN - METROPOLITAN AREA CLINIC 89 | Facility: CLINIC | Age: 73
Setting detail: DERMATOLOGY
End: 2023-08-04

## 2023-08-04 DIAGNOSIS — L82.1 OTHER SEBORRHEIC KERATOSIS: ICD-10-CM

## 2023-08-04 DIAGNOSIS — D22 MELANOCYTIC NEVI: ICD-10-CM

## 2023-08-04 DIAGNOSIS — D18.0 HEMANGIOMA: ICD-10-CM

## 2023-08-04 DIAGNOSIS — L81.4 OTHER MELANIN HYPERPIGMENTATION: ICD-10-CM

## 2023-08-04 PROBLEM — D18.01 HEMANGIOMA OF SKIN AND SUBCUTANEOUS TISSUE: Status: ACTIVE | Noted: 2023-08-04

## 2023-08-04 PROBLEM — D22.5 MELANOCYTIC NEVI OF TRUNK: Status: ACTIVE | Noted: 2023-08-04

## 2023-08-04 PROCEDURE — 99213 OFFICE O/P EST LOW 20 MIN: CPT

## 2023-08-04 PROCEDURE — ? COUNSELING

## 2023-08-04 ASSESSMENT — LOCATION DETAILED DESCRIPTION DERM
LOCATION DETAILED: MIDDLE STERNUM
LOCATION DETAILED: LEFT SUPERIOR UPPER BACK
LOCATION DETAILED: EPIGASTRIC SKIN
LOCATION DETAILED: RIGHT MEDIAL UPPER BACK

## 2023-08-04 ASSESSMENT — LOCATION SIMPLE DESCRIPTION DERM
LOCATION SIMPLE: CHEST
LOCATION SIMPLE: LEFT UPPER BACK
LOCATION SIMPLE: RIGHT UPPER BACK
LOCATION SIMPLE: ABDOMEN

## 2023-08-04 ASSESSMENT — LOCATION ZONE DERM: LOCATION ZONE: TRUNK

## 2024-02-21 PROBLEM — Z00.00 MEDICARE ANNUAL WELLNESS VISIT, SUBSEQUENT: Status: RESOLVED | Noted: 2019-03-04 | Resolved: 2024-02-21

## 2024-02-21 PROBLEM — Z13.820 SCREENING FOR OSTEOPOROSIS: Status: RESOLVED | Noted: 2019-03-04 | Resolved: 2024-02-21

## 2024-03-15 ENCOUNTER — RX ONLY (OUTPATIENT)
Age: 74
Setting detail: RX ONLY
End: 2024-03-15

## 2024-03-15 ENCOUNTER — APPOINTMENT (RX ONLY)
Dept: URBAN - METROPOLITAN AREA CLINIC 89 | Facility: CLINIC | Age: 74
Setting detail: DERMATOLOGY
End: 2024-03-15

## 2024-03-15 DIAGNOSIS — L70.0 ACNE VULGARIS: ICD-10-CM

## 2024-03-15 PROCEDURE — ? ACNE SURGERY

## 2024-03-15 PROCEDURE — 10040 EXTRACTION: CPT

## 2024-03-15 RX ORDER — TRIAMCINOLONE ACETONIDE 0.25 MG/G
APPLY OINTMENT TOPICAL BID
Qty: 15 | Refills: 3 | Status: ERX | COMMUNITY
Start: 2024-03-15

## 2024-03-15 ASSESSMENT — LOCATION DETAILED DESCRIPTION DERM
LOCATION DETAILED: NASAL DORSUM
LOCATION DETAILED: LEFT CHIN
LOCATION DETAILED: RIGHT CENTRAL MALAR CHEEK
LOCATION DETAILED: LEFT INFERIOR MEDIAL MALAR CHEEK
LOCATION DETAILED: RIGHT INFERIOR MEDIAL FOREHEAD

## 2024-03-15 ASSESSMENT — LOCATION SIMPLE DESCRIPTION DERM
LOCATION SIMPLE: RIGHT FOREHEAD
LOCATION SIMPLE: RIGHT CHEEK
LOCATION SIMPLE: LEFT CHEEK
LOCATION SIMPLE: NOSE
LOCATION SIMPLE: CHIN

## 2024-03-15 ASSESSMENT — LOCATION ZONE DERM
LOCATION ZONE: FACE
LOCATION ZONE: NOSE

## 2024-08-23 ENCOUNTER — APPOINTMENT (RX ONLY)
Dept: URBAN - METROPOLITAN AREA CLINIC 89 | Facility: CLINIC | Age: 74
Setting detail: DERMATOLOGY
End: 2024-08-23

## 2024-08-23 DIAGNOSIS — L82.0 INFLAMED SEBORRHEIC KERATOSIS: ICD-10-CM

## 2024-08-23 DIAGNOSIS — L82.1 OTHER SEBORRHEIC KERATOSIS: ICD-10-CM

## 2024-08-23 DIAGNOSIS — D18.0 HEMANGIOMA: ICD-10-CM

## 2024-08-23 DIAGNOSIS — L81.4 OTHER MELANIN HYPERPIGMENTATION: ICD-10-CM

## 2024-08-23 DIAGNOSIS — L70.0 ACNE VULGARIS: ICD-10-CM | Status: STABLE

## 2024-08-23 DIAGNOSIS — Z71.89 OTHER SPECIFIED COUNSELING: ICD-10-CM

## 2024-08-23 DIAGNOSIS — D22 MELANOCYTIC NEVI: ICD-10-CM

## 2024-08-23 PROBLEM — D22.71 MELANOCYTIC NEVI OF RIGHT LOWER LIMB, INCLUDING HIP: Status: ACTIVE | Noted: 2024-08-23

## 2024-08-23 PROBLEM — D22.5 MELANOCYTIC NEVI OF TRUNK: Status: ACTIVE | Noted: 2024-08-23

## 2024-08-23 PROBLEM — D18.01 HEMANGIOMA OF SKIN AND SUBCUTANEOUS TISSUE: Status: ACTIVE | Noted: 2024-08-23

## 2024-08-23 PROCEDURE — 99213 OFFICE O/P EST LOW 20 MIN: CPT

## 2024-08-23 PROCEDURE — ? COUNSELING

## 2024-08-23 PROCEDURE — ? PRESCRIPTION

## 2024-08-23 RX ORDER — TRETIONIN 0.25 MG/G
ONE CREAM TOPICAL AS DIRECTED
Qty: 20 | Refills: 4 | Status: ERX | COMMUNITY
Start: 2024-08-23

## 2024-08-23 RX ADMIN — TRETIONIN ONE: 0.25 CREAM TOPICAL at 00:00

## 2024-08-23 ASSESSMENT — LOCATION ZONE DERM
LOCATION ZONE: ARM
LOCATION ZONE: TRUNK
LOCATION ZONE: LEG
LOCATION ZONE: FACE

## 2024-08-23 ASSESSMENT — LOCATION SIMPLE DESCRIPTION DERM
LOCATION SIMPLE: RIGHT UPPER BACK
LOCATION SIMPLE: LEFT FOREARM
LOCATION SIMPLE: LEFT LOWER BACK
LOCATION SIMPLE: RIGHT CHEEK
LOCATION SIMPLE: LEFT UPPER BACK
LOCATION SIMPLE: LEFT SHOULDER
LOCATION SIMPLE: LEFT CHEEK
LOCATION SIMPLE: ABDOMEN
LOCATION SIMPLE: UPPER BACK
LOCATION SIMPLE: RIGHT UPPER ARM
LOCATION SIMPLE: RIGHT POSTERIOR THIGH

## 2024-08-23 ASSESSMENT — LOCATION DETAILED DESCRIPTION DERM
LOCATION DETAILED: RIGHT RIB CAGE
LOCATION DETAILED: LEFT POSTERIOR SHOULDER
LOCATION DETAILED: SUPERIOR THORACIC SPINE
LOCATION DETAILED: RIGHT INFERIOR UPPER BACK
LOCATION DETAILED: RIGHT ANTERIOR PROXIMAL UPPER ARM
LOCATION DETAILED: RIGHT INFERIOR CENTRAL MALAR CHEEK
LOCATION DETAILED: LEFT INFERIOR MEDIAL UPPER BACK
LOCATION DETAILED: RIGHT PROXIMAL POSTERIOR THIGH
LOCATION DETAILED: LEFT SUPERIOR MEDIAL MIDBACK
LOCATION DETAILED: RIGHT ANTERIOR DISTAL UPPER ARM
LOCATION DETAILED: EPIGASTRIC SKIN
LOCATION DETAILED: LEFT INFERIOR CENTRAL MALAR CHEEK
LOCATION DETAILED: LEFT VENTRAL PROXIMAL FOREARM
LOCATION DETAILED: LEFT MID-UPPER BACK

## 2024-08-23 ASSESSMENT — PAIN INTENSITY VAS: HOW INTENSE IS YOUR PAIN 0 BEING NO PAIN, 10 BEING THE MOST SEVERE PAIN POSSIBLE?: NO PAIN

## 2024-08-23 ASSESSMENT — SEVERITY ASSESSMENT OVERALL AMONG ALL PATIENTS
IN YOUR EXPERIENCE, AMONG ALL PATIENTS YOU HAVE SEEN WITH THIS CONDITION, HOW SEVERE IS THIS PATIENT'S CONDITION?: FEW INFLAMMATORY LESIONS, SOME NONINFLAMMATORY

## 2024-08-23 NOTE — PROCEDURE: REASSURANCE
Include Location In Plan?: No
Detail Level: Generalized
Additional Notes (Optional): Defers treatment at this time.
Detail Level: Zone

## 2024-08-23 NOTE — PROCEDURE: COUNSELING
Detail Level: Generalized
Detail Level: Zone
Topical Clindamycin Pregnancy And Lactation Text: This medication is Pregnancy Category B and is considered safe during pregnancy. It is unknown if it is excreted in breast milk.
Benzoyl Peroxide Counseling: Patient counseled that medicine may cause skin irritation and bleach clothing.  In the event of skin irritation, the patient was advised to reduce the amount of the drug applied or use it less frequently.   The patient verbalized understanding of the proper use and possible adverse effects of benzoyl peroxide.  All of the patient's questions and concerns were addressed.
Bactrim Counseling:  I discussed with the patient the risks of sulfa antibiotics including but not limited to GI upset, allergic reaction, drug rash, diarrhea, dizziness, photosensitivity, and yeast infections.  Rarely, more serious reactions can occur including but not limited to aplastic anemia, agranulocytosis, methemoglobinemia, blood dyscrasias, liver or kidney failure, lung infiltrates or desquamative/blistering drug rashes.
Dapsone Pregnancy And Lactation Text: This medication is Pregnancy Category C and is not considered safe during pregnancy or breast feeding.
Azithromycin Counseling:  I discussed with the patient the risks of azithromycin including but not limited to GI upset, allergic reaction, drug rash, diarrhea, and yeast infections.
Minocycline Counseling: Patient advised regarding possible photosensitivity and discoloration of the teeth, skin, lips, tongue and gums.  Patient instructed to avoid sunlight, if possible.  When exposed to sunlight, patients should wear protective clothing, sunglasses, and sunscreen.  The patient was instructed to call the office immediately if the following severe adverse effects occur:  hearing changes, easy bruising/bleeding, severe headache, or vision changes.  The patient verbalized understanding of the proper use and possible adverse effects of minocycline.  All of the patient's questions and concerns were addressed.
Tetracycline Pregnancy And Lactation Text: This medication is Pregnancy Category D and not consider safe during pregnancy. It is also excreted in breast milk.
Topical Retinoid counseling:  Patient advised to apply a pea-sized amount only at bedtime and wait 30 minutes after washing their face before applying.  If too drying, patient may add a non-comedogenic moisturizer. The patient verbalized understanding of the proper use and possible adverse effects of retinoids.  All of the patient's questions and concerns were addressed.
Doxycycline Pregnancy And Lactation Text: This medication is Pregnancy Category D and not consider safe during pregnancy. It is also excreted in breast milk but is considered safe for shorter treatment courses.
Winlevi Counseling:  I discussed with the patient the risks of topical clascoterone including but not limited to erythema, scaling, itching, and stinging. Patient voiced their understanding.
Topical Retinoid Pregnancy And Lactation Text: This medication is Pregnancy Category C. It is unknown if this medication is excreted in breast milk.
High Dose Vitamin A Counseling: Side effects reviewed, pt to contact office should one occur.
Spironolactone Pregnancy And Lactation Text: This medication can cause feminization of the male fetus and should be avoided during pregnancy. The active metabolite is also found in breast milk.
Bactrim Pregnancy And Lactation Text: This medication is Pregnancy Category D and is known to cause fetal risk.  It is also excreted in breast milk.
Tazorac Pregnancy And Lactation Text: This medication is not safe during pregnancy. It is unknown if this medication is excreted in breast milk.
Isotretinoin Counseling: Patient should get monthly blood tests, not donate blood, not drive at night if vision affected, not share medication, and not undergo elective surgery for 6 months after tx completed. Side effects reviewed, pt to contact office should one occur.
Azelaic Acid Counseling: Patient counseled that medicine may cause skin irritation and to avoid applying near the eyes.  In the event of skin irritation, the patient was advised to reduce the amount of the drug applied or use it less frequently.   The patient verbalized understanding of the proper use and possible adverse effects of azelaic acid.  All of the patient's questions and concerns were addressed.
Birth Control Pills Pregnancy And Lactation Text: This medication should be avoided if pregnant and for the first 30 days post-partum.
High Dose Vitamin A Pregnancy And Lactation Text: High dose vitamin A therapy is contraindicated during pregnancy and breast feeding.
Tetracycline Counseling: Patient counseled regarding possible photosensitivity and increased risk for sunburn.  Patient instructed to avoid sunlight, if possible.  When exposed to sunlight, patients should wear protective clothing, sunglasses, and sunscreen.  The patient was instructed to call the office immediately if the following severe adverse effects occur:  hearing changes, easy bruising/bleeding, severe headache, or vision changes.  The patient verbalized understanding of the proper use and possible adverse effects of tetracycline.  All of the patient's questions and concerns were addressed. Patient understands to avoid pregnancy while on therapy due to potential birth defects.
Doxycycline Counseling:  Patient counseled regarding possible photosensitivity and increased risk for sunburn.  Patient instructed to avoid sunlight, if possible.  When exposed to sunlight, patients should wear protective clothing, sunglasses, and sunscreen.  The patient was instructed to call the office immediately if the following severe adverse effects occur:  hearing changes, easy bruising/bleeding, severe headache, or vision changes.  The patient verbalized understanding of the proper use and possible adverse effects of doxycycline.  All of the patient's questions and concerns were addressed.
Azelaic Acid Pregnancy And Lactation Text: This medication is considered safe during pregnancy and breast feeding.
Spironolactone Counseling: Patient advised regarding risks of diarrhea, abdominal pain, hyperkalemia, birth defects (for female patients), liver toxicity and renal toxicity. The patient may need blood work to monitor liver and kidney function and potassium levels while on therapy. The patient verbalized understanding of the proper use and possible adverse effects of spironolactone.  All of the patient's questions and concerns were addressed.
Topical Clindamycin Counseling: Patient counseled that this medication may cause skin irritation or allergic reactions.  In the event of skin irritation, the patient was advised to reduce the amount of the drug applied or use it less frequently.   The patient verbalized understanding of the proper use and possible adverse effects of clindamycin.  All of the patient's questions and concerns were addressed.
Erythromycin Counseling:  I discussed with the patient the risks of erythromycin including but not limited to GI upset, allergic reaction, drug rash, diarrhea, increase in liver enzymes, and yeast infections.
Aklief counseling:  Patient advised to apply a pea-sized amount only at bedtime and wait 30 minutes after washing their face before applying.  If too drying, patient may add a non-comedogenic moisturizer.  The most commonly reported side effects including irritation, redness, scaling, dryness, stinging, burning, itching, and increased risk of sunburn.  The patient verbalized understanding of the proper use and possible adverse effects of retinoids.  All of the patient's questions and concerns were addressed.
Benzoyl Peroxide Pregnancy And Lactation Text: This medication is Pregnancy Category C. It is unknown if benzoyl peroxide is excreted in breast milk.
Topical Sulfur Applications Counseling: Topical Sulfur Counseling: Patient counseled that this medication may cause skin irritation or allergic reactions.  In the event of skin irritation, the patient was advised to reduce the amount of the drug applied or use it less frequently.   The patient verbalized understanding of the proper use and possible adverse effects of topical sulfur application.  All of the patient's questions and concerns were addressed.
Azithromycin Pregnancy And Lactation Text: This medication is considered safe during pregnancy and is also secreted in breast milk.
Include Pregnancy/Lactation Warning?: No
Aklief Pregnancy And Lactation Text: It is unknown if this medication is safe to use during pregnancy.  It is unknown if this medication is excreted in breast milk.  Breastfeeding women should use the topical cream on the smallest area of the skin for the shortest time needed while breastfeeding.  Do not apply to nipple and areola.
Sarecycline Counseling: Patient advised regarding possible photosensitivity and discoloration of the teeth, skin, lips, tongue and gums.  Patient instructed to avoid sunlight, if possible.  When exposed to sunlight, patients should wear protective clothing, sunglasses, and sunscreen.  The patient was instructed to call the office immediately if the following severe adverse effects occur:  hearing changes, easy bruising/bleeding, severe headache, or vision changes.  The patient verbalized understanding of the proper use and possible adverse effects of sarecycline.  All of the patient's questions and concerns were addressed.
Tazorac Counseling:  Patient advised that medication is irritating and drying.  Patient may need to apply sparingly and wash off after an hour before eventually leaving it on overnight.  The patient verbalized understanding of the proper use and possible adverse effects of tazorac.  All of the patient's questions and concerns were addressed.
Winlevi Pregnancy And Lactation Text: This medication is considered safe during pregnancy and breastfeeding.
Erythromycin Pregnancy And Lactation Text: This medication is Pregnancy Category B and is considered safe during pregnancy. It is also excreted in breast milk.
Moisturizer Recommendations: Cerave
Topical Sulfur Applications Pregnancy And Lactation Text: This medication is Pregnancy Category C and has an unknown safety profile during pregnancy. It is unknown if this topical medication is excreted in breast milk.
Birth Control Pills Counseling: Birth Control Pill Counseling: I discussed with the patient the potential side effects of OCPs including but not limited to increased risk of stroke, heart attack, thrombophlebitis, deep venous thrombosis, hepatic adenomas, breast changes, GI upset, headaches, and depression.  The patient verbalized understanding of the proper use and possible adverse effects of OCPs. All of the patient's questions and concerns were addressed.
Dapsone Counseling: I discussed with the patient the risks of dapsone including but not limited to hemolytic anemia, agranulocytosis, rashes, methemoglobinemia, kidney failure, peripheral neuropathy, headaches, GI upset, and liver toxicity.  Patients who start dapsone require monitoring including baseline LFTs and weekly CBCs for the first month, then every month thereafter.  The patient verbalized understanding of the proper use and possible adverse effects of dapsone.  All of the patient's questions and concerns were addressed.
Isotretinoin Pregnancy And Lactation Text: This medication is Pregnancy Category X and is considered extremely dangerous during pregnancy. It is unknown if it is excreted in breast milk.

## 2024-09-16 ENCOUNTER — APPOINTMENT (RX ONLY)
Dept: URBAN - METROPOLITAN AREA CLINIC 89 | Facility: CLINIC | Age: 74
Setting detail: DERMATOLOGY
End: 2024-09-16

## 2024-09-16 DIAGNOSIS — L70.0 ACNE VULGARIS: ICD-10-CM

## 2024-09-16 PROCEDURE — 10040 EXTRACTION: CPT

## 2024-09-16 PROCEDURE — ? ACNE SURGERY

## 2024-09-16 ASSESSMENT — LOCATION SIMPLE DESCRIPTION DERM
LOCATION SIMPLE: CHIN
LOCATION SIMPLE: LEFT CHEEK
LOCATION SIMPLE: RIGHT CHEEK
LOCATION SIMPLE: RIGHT FOREHEAD
LOCATION SIMPLE: NOSE

## 2024-09-16 ASSESSMENT — LOCATION DETAILED DESCRIPTION DERM
LOCATION DETAILED: LEFT INFERIOR MEDIAL MALAR CHEEK
LOCATION DETAILED: LEFT CHIN
LOCATION DETAILED: RIGHT INFERIOR MEDIAL FOREHEAD
LOCATION DETAILED: NASAL DORSUM
LOCATION DETAILED: RIGHT CENTRAL MALAR CHEEK

## 2024-09-16 ASSESSMENT — LOCATION ZONE DERM
LOCATION ZONE: NOSE
LOCATION ZONE: FACE

## 2025-01-20 PROBLEM — S06.9XAS LATE EFFECT OF INTRACRANIAL INJURY (HCC): Status: ACTIVE | Noted: 2023-03-13

## 2025-01-20 PROBLEM — H04.123 DRY EYES: Status: ACTIVE | Noted: 2024-08-15

## 2025-01-20 PROBLEM — Z86.69 HISTORY OF MIGRAINE WITH AURA: Status: ACTIVE | Noted: 2022-02-08

## 2025-01-20 PROBLEM — H26.9 CAPSULAR CATARACT OF LEFT EYE: Status: ACTIVE | Noted: 2024-12-26

## 2025-04-14 ENCOUNTER — APPOINTMENT (OUTPATIENT)
Dept: URBAN - METROPOLITAN AREA CLINIC 89 | Facility: CLINIC | Age: 75
Setting detail: DERMATOLOGY
End: 2025-04-14

## 2025-04-14 DIAGNOSIS — L70.0 ACNE VULGARIS: ICD-10-CM

## 2025-04-14 PROCEDURE — 10040 EXTRACTION: CPT

## 2025-04-14 PROCEDURE — ? ACNE SURGERY

## 2025-04-14 ASSESSMENT — LOCATION DETAILED DESCRIPTION DERM
LOCATION DETAILED: RIGHT INFERIOR MEDIAL FOREHEAD
LOCATION DETAILED: RIGHT CENTRAL MALAR CHEEK
LOCATION DETAILED: LEFT INFERIOR MEDIAL MALAR CHEEK
LOCATION DETAILED: LEFT CHIN
LOCATION DETAILED: NASAL DORSUM

## 2025-04-14 ASSESSMENT — LOCATION SIMPLE DESCRIPTION DERM
LOCATION SIMPLE: RIGHT CHEEK
LOCATION SIMPLE: CHIN
LOCATION SIMPLE: NOSE
LOCATION SIMPLE: LEFT CHEEK
LOCATION SIMPLE: RIGHT FOREHEAD

## 2025-04-14 ASSESSMENT — LOCATION ZONE DERM
LOCATION ZONE: FACE
LOCATION ZONE: NOSE

## 2025-05-06 ENCOUNTER — CONSULT (OUTPATIENT)
Dept: SURGERY | Facility: CLINIC | Age: 75
End: 2025-05-06
Attending: INTERNAL MEDICINE
Payer: COMMERCIAL

## 2025-05-06 VITALS
DIASTOLIC BLOOD PRESSURE: 72 MMHG | WEIGHT: 224 LBS | BODY MASS INDEX: 38.24 KG/M2 | TEMPERATURE: 98.5 F | OXYGEN SATURATION: 97 % | HEART RATE: 78 BPM | HEIGHT: 64 IN | SYSTOLIC BLOOD PRESSURE: 138 MMHG

## 2025-05-06 DIAGNOSIS — R10.31 RIGHT LOWER QUADRANT ABDOMINAL PAIN: ICD-10-CM

## 2025-05-06 DIAGNOSIS — R10.84 ABDOMINAL PAIN, GENERALIZED: Primary | ICD-10-CM

## 2025-05-06 DIAGNOSIS — R10.31 PAIN IN THE GROIN, RIGHT: ICD-10-CM

## 2025-05-06 PROCEDURE — 99204 OFFICE O/P NEW MOD 45 MIN: CPT | Performed by: SPECIALIST

## 2025-05-06 NOTE — LETTER
May 6, 2025     Pj Dorman IV, MD  [address]  [address]  [place] [address]    Patient: Alanna Roque   YOB: 1950   Date of Visit: 2025     Dear Larry,    Thank you for referring Alanna Roque to me for evaluation. Below are my notes for this consultation.    If you have questions, please do not hesitate to call me. I look forward to following your patient along with you.         Sincerely,    King Keenan Ross MD        CC: MD Keenan Boo MD  2025  4:50 PM  Sign when Signing Visit  Name: Alanna Roque      : 1950      MRN: [id number]  Encounter Provider: Keenan Ross MD  Encounter Date: 2025   Encounter department: [facility] GENERAL SURGERY [facility] [address]  :  Assessment & Plan  Right lower quadrant abdominal pain  Refer below  Orders:  •  Ambulatory Referral to General Surgery    Pain in the groin, right  At this point her tenderness is slightly higher than the groin and slightly lower than the right lower quadrant.  There is no evidence of incisional hernia through the Pfannenstiel incision.  She needs imaging to further evaluate this area.  At this point we will schedule CT scan of the abdomen and pelvis with contrast to see if this is a better idea of what might be going on.  This is negative perhaps an exploration of the area would be indicated.  She understands and we will proceed with this at the earliest possible date.  Orders:  •  Ambulatory Referral to General Surgery    Abdominal pain, generalized    Orders:  •  CT abdomen pelvis with and without contrast; Future        History of Present Illness  Abdominal Pain  Pertinent negatives include no arthralgias, dysuria, fever, hematuria or vomiting.     Alanna Roque is a 74 y.o. female who presents to the office with a a 1 month history of right lower quadrant and right groin pain.  Apparently she had some imaging that did not demonstrate the etiology of her  pain.  She is followed in the office of Dr. Pj Dorman the fourth and felt she might have a hernia but was not sure.  He referred her to our office for evaluation.  She denies any bulge in the area and denies any GI component.  She is just tired of the pain and would like to figure out what is causing it.    She has a Pfannenstiel incision from an abdominal exploration for a ruptured ectopic pregnancy.  (Lev Cope MD)          Review of Systems   Constitutional:  Negative for chills and fever.   HENT:  Negative for ear pain and sore throat.    Eyes:  Negative for pain and visual disturbance.   Respiratory:  Negative for cough and shortness of breath.    Cardiovascular:  Negative for chest pain and palpitations.   Gastrointestinal:  Positive for abdominal pain. Negative for vomiting.   Genitourinary:  Negative for dysuria and hematuria.   Musculoskeletal:  Negative for arthralgias and back pain.   Skin:  Negative for color change and rash.   Neurological:  Negative for seizures and syncope.   All other systems reviewed and are negative.    Past Medical History  Past Medical History:   Diagnosis Date   • Anxiety    • Arthritis    • Carpal tunnel syndrome    • Cytomegalovirus infection (HCC)    • Depression    • Disease of thyroid gland    • Diverticulosis    • Esophageal reflux    • GERD (gastroesophageal reflux disease)    • Glaucoma    • Herpes zoster    • Interstitial cystitis    • Kidney stones, calcium oxalate    • Lumbar disc disorder    • Malignant neoplasm of skin    • Obesity    • Sleep apnea      Past Surgical History:   Procedure Laterality Date   • BLADDER EXTROPHY RECONSTRUCTION PELVIC SAGITTAL OSTEOTOMY     • BREAST BIOPSY Left     aprox 2010 benign   • BREAST EXCISIONAL BIOPSY Left     2010   • COLONOSCOPY  06/22/2015    erticulosis fair prep by Dr. Perdue   • COLONOSCOPY  07/21/2016    Diverticulosis-good prep by Dr. Perdue   • COLONOSCOPY  09/21/2021    ALEAH Valladares MD.-  Diverticulosis in sigmoid colon and descending colon.   • EGD  05/05/2015    Multiple gastric polyps biopsy showed fundic gland polyp, wide Schatzki's ring, 4 cm hiatal hernia, slight esophagitis, biopsy stomach negative for H. pylori   • EGD  09/21/2021    medium hiatal hernia, moderate Schatzki's ring, grade A esophagitis, small amount of residual food, fundic gland polyps and gastritis.  Biopsies showed low-grade dysplasia in the fundic gland polyp, no H. pylori   • EGD  09/05/2023    Diffuse moderate inflammation, edema and erythema prepyloric region; large hiatal hernia with Leonid lesions; moderate Schatzki's ring needed to [address]; esophagus healed; duodenum normal; colon biopsies prepyloric region positive superficial inflammation; Negative H. pylori.  Dr. Dorman   • [facility]  [facility]14/2022    [facility]- Hiatal hernia; Schatzki's ring, dilated.   • EGD  01/11/2022    [facility] [place]- Multiple gastric polyps; medium sized hiatal hernia; erosive gastropathy with stigmata; LA Grade C esophagitis. Bx: Benign fundic gland polyps; negative for dysplasia and ulceration, H. pylori.   • NEUROPLASTY / TRANSPOSITION MEDIAN NERVE AT CARPAL TUNNEL     • OOPHORECTOMY  1997   • REPLACEMENT TOTAL KNEE Bilateral    • SINUS SURGERY     • TOTAL ABDOMINAL HYSTERECTOMY  1997     Family History   Problem Relation Age of Onset   • Coronary artery disease Mother    • Other Mother         domestic violence of adult   • Hyperlipidemia Mother    • Coronary artery disease Father    • Breast cancer Daughter 50   • Liver cancer Daughter 49   • Pancreatic cancer Daughter 49   • No Known Problems Daughter    • Coronary artery disease Maternal Grandmother    • Diabetes Maternal Grandmother    • No Known Problems Paternal Grandmother    • No Known Problems Paternal Grandfather    • No Known Problems Son    • No Known Problems Maternal Aunt    • No Known Problems Maternal Aunt    • No Known Problems Maternal Aunt    • No Known Problems  Maternal Aunt    • No Known Problems Paternal Aunt    • No Known Problems Paternal Aunt    • No Known Problems Half-Sister    • No Known Problems Half-Sister    • No Known Problems Half-Brother    • No Known Problems Half-Brother       reports that she has never smoked. She has never used smokeless tobacco. She reports current alcohol use of about 1.0 standard drink of alcohol per week. She reports that she does not use drugs.  Current Outpatient Medications   Medication Instructions   • Acetaminophen 325 MG CAPS    • albuterol (PROVENTIL HFA,VENTOLIN HFA) 90 mcg/act inhaler As needed   • ALPRAZolam (XANAX) 0.25 mg tablet    • ARIPiprazole (ABILIFY) 2 mg tablet aripiprazole 2 mg tablet   TAKE 1 TABLET BY MOUTH NIGHTLY AS NEEDED GENERIC EQUIVALENT FOR ABILIFY   • baclofen 10 mg, Daily   • budesonide-formoterol (SYMBICORT) 80-4.5 MCG/ACT inhaler 1 puff, As needed   • buPROPion (WELLBUTRIN XL) 150 mg, Oral, Daily   • busPIRone (BUSPAR) 10 mg tablet take 1/2 tablet by mouth twice a day for 2 WEEKS, THEN 1 twice a day   • butalbital-acetaminophen-caffeine (FIORICET,ESGIC) -40 mg per tablet    • clonazePAM (KLONOPIN) 0.5 mg, Oral, 2 times daily PRN   • cycloSPORINE (RESTASIS) 0.05 % ophthalmic emulsion Instill 1 drop in each eye twice daily.   • EPINEPHrine (EPIPEN) 0.3 mg/0.3 mL SOAJ Inject into the shoulder, thigh, or buttocks   • escitalopram (LEXAPRO) 20 mg tablet TAKE 2 TABLETS BY MOUTH DAILY. GENERIC EQUIVALENT FOR LEXAPRO   • famotidine (PEPCID) 20 mg, Oral, 2 times daily   • gabapentin (NEURONTIN) 300 mg capsule 1 capsule, 3 times daily   • gabapentin (NEURONTIN) 600 MG tablet 2 times daily   •  MG tablet TAKE 1 TABLET THREE TIMES A DAY AS NEEDED FOR MILD PAIN   • lansoprazole (PREVACID) 30 mg, Oral, Daily   • levalbuterol (XOPENEX) 0.63 mg/3 mL nebulizer solution    • loteprednol etabonate (LOTEMAX) 0.5 % ophthalmic suspension No dose, route, or frequency recorded.   • MAGNESIUM PO Take by mouth   •  "MULTIPLE VITAMIN PO Take by mouth   • ondansetron (ZOFRAN) 4 mg, Oral, Every 6 hours PRN   • pantoprazole (PROTONIX) 40 mg, Daily   • rosuvastatin (CRESTOR) 5 mg tablet    • SYNTHROID 25 MCG tablet TAKE 1 TABLET DAILY   • tolterodine (DETROL LA) 4 mg 24 hr capsule tolterodine ER 4 mg capsule,extended release 24 hr   • Toviaz 8 MG TB24 Daily     Allergies   Allergen Reactions   • Hymenoptera Venom Preparations Throat Swelling and Anaphylaxis     Throat swelling       • Eletriptan Cough   • Sulfa Antibiotics Hives     swelling  swelling   • Sulfasalazine Hives and Swelling     Has received toradol without problems   • Amoxicillin Hives   • Augmentin Es-600  [Amoxicillin-Pot Clavulanate]    • Bee Venom    • Doxycycline Other (See Comments) and Itching   • Other      Fire ants   • Tramadol Vomiting and GI Intolerance     Projectile Vomiting    Projectile Vomiting         Objective  /72 (BP Location: Left arm, Patient Position: Sitting, Cuff Size: Adult)   Pulse 78   Temp 98.5 °F (36.9 °C) (Tympanic)   Ht 5' 4\" (1.626 m)   Wt 102 kg (224 lb)   SpO2 97%   BMI 38.45 kg/m²      Physical Exam  Vitals and nursing note reviewed.   Constitutional:       General: She is not in acute distress.     Appearance: She is well-developed.   HENT:      Head: Normocephalic and atraumatic.   Eyes:      Conjunctiva/sclera: Conjunctivae normal.   Cardiovascular:      Rate and Rhythm: Normal rate and regular rhythm.      Heart sounds: No murmur heard.  Pulmonary:      Effort: Pulmonary effort is normal. No respiratory distress.      Breath sounds: Normal breath sounds.   Abdominal:      Palpations: Abdomen is soft.      Tenderness: There is abdominal tenderness in the right lower quadrant.      Comments: There is a transverse Pfannenstiel incision in the lower abdomen.  There is no evidence of incisional hernia in this area.  She is quite tender in the right lower quadrant/groin area.  No visible or palpable hernias noted in this " area.   Musculoskeletal:         General: No swelling.      Cervical back: Neck supple.   Skin:     General: Skin is warm and dry.      Capillary Refill: Capillary refill takes less than 2 seconds.   Neurological:      Mental Status: She is alert.   Psychiatric:         Mood and Affect: Mood normal.

## 2025-05-06 NOTE — PROGRESS NOTES
Name: Alanna Roque      : 1950      MRN: [id number]  Encounter Provider: Keenan Ross MD  Encounter Date: 2025   Encounter department: [facility] GENERAL SURGERY [facility] [address]  :  Assessment & Plan  Right lower quadrant abdominal pain  Refer below  Orders:    Ambulatory Referral to General Surgery    Pain in the groin, right  At this point her tenderness is slightly higher than the groin and slightly lower than the right lower quadrant.  There is no evidence of incisional hernia through the Pfannenstiel incision.  She needs imaging to further evaluate this area.  At this point we will schedule CT scan of the abdomen and pelvis with contrast to see if this is a better idea of what might be going on.  This is negative perhaps an exploration of the area would be indicated.  She understands and we will proceed with this at the earliest possible date.  Orders:    Ambulatory Referral to General Surgery    Abdominal pain, generalized    Orders:    CT abdomen pelvis with and without contrast; Future        History of Present Illness   Abdominal Pain  Pertinent negatives include no arthralgias, dysuria, fever, hematuria or vomiting.     Alanna Roque is a 74 y.o. female who presents to the office with a a 1 month history of right lower quadrant and right groin pain.  Apparently she had some imaging that did not demonstrate the etiology of her pain.  She is followed in the office of Dr. Pj Dorman the fourth and felt she might have a hernia but was not sure.  He referred her to our office for evaluation.  She denies any bulge in the area and denies any GI component.  She is just tired of the pain and would like to figure out what is causing it.    She has a Pfannenstiel incision from an abdominal exploration for a ruptured ectopic pregnancy.  (Lev Cope MD)          Review of Systems   Constitutional:  Negative for chills and fever.   HENT:  Negative for ear pain and sore throat.     Eyes:  Negative for pain and visual disturbance.   Respiratory:  Negative for cough and shortness of breath.    Cardiovascular:  Negative for chest pain and palpitations.   Gastrointestinal:  Positive for abdominal pain. Negative for vomiting.   Genitourinary:  Negative for dysuria and hematuria.   Musculoskeletal:  Negative for arthralgias and back pain.   Skin:  Negative for color change and rash.   Neurological:  Negative for seizures and syncope.   All other systems reviewed and are negative.    Past Medical History   Past Medical History:   Diagnosis Date    Anxiety     Arthritis     Carpal tunnel syndrome     Cytomegalovirus infection (HCC)     Depression     Disease of thyroid gland     Diverticulosis     Esophageal reflux     GERD (gastroesophageal reflux disease)     Glaucoma     Herpes zoster     Interstitial cystitis     Kidney stones, calcium oxalate     Lumbar disc disorder     Malignant neoplasm of skin     Obesity     Sleep apnea      Past Surgical History:   Procedure Laterality Date    BLADDER EXTROPHY RECONSTRUCTION PELVIC SAGITTAL OSTEOTOMY      BREAST BIOPSY Left     aprox 2010 benign    BREAST EXCISIONAL BIOPSY Left     2010    COLONOSCOPY  06/22/2015    erticulosis fair prep by Dr. Perdue    COLONOSCOPY  07/21/2016    Diverticulosis-good prep by Dr. Perdue    COLONOSCOPY  09/21/2021    ALEAH Valladares MD.- Diverticulosis in sigmoid colon and descending colon.    EGD  05/05/2015    Multiple gastric polyps biopsy showed fundic gland polyp, wide Schatzki's ring, 4 cm hiatal hernia, slight esophagitis, biopsy stomach negative for H. pylori    EGD  09/21/2021    medium hiatal hernia, moderate Schatzki's ring, grade A esophagitis, small amount of residual food, fundic gland polyps and gastritis.  Biopsies showed low-grade dysplasia in the fundic gland polyp, no H. pylori    EGD  09/05/2023    Diffuse moderate inflammation, edema and erythema prepyloric region; large hiatal hernia with  Leonid lesions; moderate Schatzki's ring needed to [address]; esophagus healed; duodenum normal; colon biopsies prepyloric region positive superficial inflammation; Negative H. pylori.  Dr. Dorman    EGD  06/14/2022    Jeremie HASSAN\[facility]\""- SL- Hiatal hernia; Schatzki's ring, dilated.    EGD  01/11/2022    [facility]- [place]- Multiple gastric polyps; medium sized hiatal hernia; erosive gastropathy with stigmata; LA Grade C esophagitis. Bx: Benign fundic gland polyps; negative for dysplasia and ulceration, H. pylori.    NEUROPLASTY / TRANSPOSITION MEDIAN NERVE AT CARPAL TUNNEL      OOPHORECTOMY  1997    REPLACEMENT TOTAL KNEE Bilateral     SINUS SURGERY      TOTAL ABDOMINAL HYSTERECTOMY  1997     Family History   Problem Relation Age of Onset    Coronary artery disease Mother     Other Mother         domestic violence of adult    Hyperlipidemia Mother     Coronary artery disease Father     Breast cancer Daughter 50    Liver cancer Daughter 49    Pancreatic cancer Daughter 49    No Known Problems Daughter     Coronary artery disease Maternal Grandmother     Diabetes Maternal Grandmother     No Known Problems Paternal Grandmother     No Known Problems Paternal Grandfather     No Known Problems Son     No Known Problems Maternal Aunt     No Known Problems Maternal Aunt     No Known Problems Maternal Aunt     No Known Problems Maternal Aunt     No Known Problems Paternal Aunt     No Known Problems Paternal Aunt     No Known Problems Half-Sister     No Known Problems Half-Sister     No Known Problems Half-Brother     No Known Problems Half-Brother       reports that she has never smoked. She has never used smokeless tobacco. She reports current alcohol use of about 1.0 standard drink of alcohol per week. She reports that she does not use drugs.  Current Outpatient Medications   Medication Instructions    Acetaminophen 325 MG CAPS     albuterol (PROVENTIL HFA,VENTOLIN HFA) 90 mcg/act inhaler As needed    ALPRAZolam (XANAX) 0.25 mg  tablet     ARIPiprazole (ABILIFY) 2 mg tablet aripiprazole 2 mg tablet   TAKE 1 TABLET BY MOUTH NIGHTLY AS NEEDED GENERIC EQUIVALENT FOR ABILIFY    baclofen 10 mg, Daily    budesonide-formoterol (SYMBICORT) 80-4.5 MCG/ACT inhaler 1 puff, As needed    buPROPion (WELLBUTRIN XL) 150 mg, Oral, Daily    busPIRone (BUSPAR) 10 mg tablet take 1/2 tablet by mouth twice a day for 2 WEEKS, THEN 1 twice a day    butalbital-acetaminophen-caffeine (FIORICET,ESGIC) -40 mg per tablet     clonazePAM (KLONOPIN) 0.5 mg, Oral, 2 times daily PRN    cycloSPORINE (RESTASIS) 0.05 % ophthalmic emulsion Instill 1 drop in each eye twice daily.    EPINEPHrine (EPIPEN) 0.3 mg/0.3 mL SOAJ Inject into the shoulder, thigh, or buttocks    escitalopram (LEXAPRO) 20 mg tablet TAKE 2 TABLETS BY MOUTH DAILY. GENERIC EQUIVALENT FOR LEXAPRO    famotidine (PEPCID) 20 mg, Oral, 2 times daily    gabapentin (NEURONTIN) 300 mg capsule 1 capsule, 3 times daily    gabapentin (NEURONTIN) 600 MG tablet 2 times daily     MG tablet TAKE 1 TABLET THREE TIMES A DAY AS NEEDED FOR MILD PAIN    lansoprazole (PREVACID) 30 mg, Oral, Daily    levalbuterol (XOPENEX) 0.63 mg/3 mL nebulizer solution     loteprednol etabonate (LOTEMAX) 0.5 % ophthalmic suspension No dose, route, or frequency recorded.    MAGNESIUM PO Take by mouth    MULTIPLE VITAMIN PO Take by mouth    ondansetron (ZOFRAN) 4 mg, Oral, Every 6 hours PRN    pantoprazole (PROTONIX) 40 mg, Daily    rosuvastatin (CRESTOR) 5 mg tablet     SYNTHROID 25 MCG tablet TAKE 1 TABLET DAILY    tolterodine (DETROL LA) 4 mg 24 hr capsule tolterodine ER 4 mg capsule,extended release 24 hr    Toviaz 8 MG TB24 Daily     Allergies   Allergen Reactions    Hymenoptera Venom Preparations Throat Swelling and Anaphylaxis     Throat swelling        Eletriptan Cough    Sulfa Antibiotics Hives     swelling  swelling    Sulfasalazine Hives and Swelling     Has received toradol without problems    Amoxicillin Hives     "Augmentin Es-600  [Amoxicillin-Pot Clavulanate]     Bee Venom     Doxycycline Other (See Comments) and Itching    Other      Fire ants    Tramadol Vomiting and GI Intolerance     Projectile Vomiting    Projectile Vomiting         Objective   /72 (BP Location: Left arm, Patient Position: Sitting, Cuff Size: Adult)   Pulse 78   Temp 98.5 °F (36.9 °C) (Tympanic)   Ht 5' 4\" (1.626 m)   Wt 102 kg (224 lb)   SpO2 97%   BMI 38.45 kg/m²      Physical Exam  Vitals and nursing note reviewed.   Constitutional:       General: She is not in acute distress.     Appearance: She is well-developed.   HENT:      Head: Normocephalic and atraumatic.   Eyes:      Conjunctiva/sclera: Conjunctivae normal.   Cardiovascular:      Rate and Rhythm: Normal rate and regular rhythm.      Heart sounds: No murmur heard.  Pulmonary:      Effort: Pulmonary effort is normal. No respiratory distress.      Breath sounds: Normal breath sounds.   Abdominal:      Palpations: Abdomen is soft.      Tenderness: There is abdominal tenderness in the right lower quadrant.      Comments: There is a transverse Pfannenstiel incision in the lower abdomen.  There is no evidence of incisional hernia in this area.  She is quite tender in the right lower quadrant/groin area.  No visible or palpable hernias noted in this area.   Musculoskeletal:         General: No swelling.      Cervical back: Neck supple.   Skin:     General: Skin is warm and dry.      Capillary Refill: Capillary refill takes less than 2 seconds.   Neurological:      Mental Status: She is alert.   Psychiatric:         Mood and Affect: Mood normal.           "

## 2025-05-12 ENCOUNTER — TELEPHONE (OUTPATIENT)
Age: 75
End: 2025-05-12

## 2025-05-12 NOTE — TELEPHONE ENCOUNTER
Patient called in as she received a call to cancel her CT scan that was scheduled today and was advised that her PA is still pending. She was advised that her CT scan would need to be rescheduled and has been rescheduled for Thursday 5/15. She is unhappy as they told her that the PA was submitted to just medicare and not her [employer]/ [place] plan. Patient would like to know how to get this PA through so that she is able to have the CT scan done on Thursday as she is in a lot of abdominal discomfort. She states that if it needs to be rescheduled again, she will go somewhere else. Please call her on her cell phone to discuss at [phone].

## 2025-05-15 ENCOUNTER — TELEPHONE (OUTPATIENT)
Dept: SURGERY | Facility: CLINIC | Age: 75
End: 2025-05-15

## 2025-05-15 ENCOUNTER — HOSPITAL ENCOUNTER (OUTPATIENT)
Dept: CT IMAGING | Facility: HOSPITAL | Age: 75
End: 2025-05-15
Attending: SPECIALIST
Payer: COMMERCIAL

## 2025-05-15 ENCOUNTER — TELEPHONE (OUTPATIENT)
Dept: OTHER | Facility: OTHER | Age: 75
End: 2025-05-15

## 2025-05-15 DIAGNOSIS — R10.84 ABDOMINAL PAIN, GENERALIZED: ICD-10-CM

## 2025-05-15 PROCEDURE — 74177 CT ABD & PELVIS W/CONTRAST: CPT

## 2025-05-15 RX ADMIN — IOHEXOL 100 ML: 350 INJECTION, SOLUTION INTRAVENOUS at 15:16

## 2025-05-15 NOTE — TELEPHONE ENCOUNTER
Patient called and stated she accidentally drank her first bottle of contrast at 8:30 pm last night instead of 8:30 this morning for her CT of her abdomen that is scheduled for 3:30 pm today. Patient stated she has the second bottle she was supposed to take later on today that she can take at 8:30 am but will need another bottle of contrast for later on. Please call patient to follow up and see if she is able to  more contrast from the office. Patient stated she received the contrast at the office when she was there for her appointment last week. Patient is requesting a call on her cell phone [phone], thank you!

## 2025-05-21 ENCOUNTER — TELEPHONE (OUTPATIENT)
Dept: SURGERY | Facility: CLINIC | Age: 75
End: 2025-05-21

## 2025-05-21 NOTE — TELEPHONE ENCOUNTER
FOLLOW UP: Patient's CB# is [phone]    REASON FOR CONVERSATION: CT Results    SYMPTOMS: Abdominal pain    OTHER: Patient had CT done per  on 5/15/25 evaluate her abdominal pain which she she still having. Written report has not be completed as of yet. Told patient she would be called back as soon as     DISPOSITION: Discuss with Provider and Call Back Patient

## 2025-05-28 ENCOUNTER — TELEPHONE (OUTPATIENT)
Dept: SURGERY | Facility: CLINIC | Age: 75
End: 2025-05-28

## 2025-05-28 NOTE — TELEPHONE ENCOUNTER
Dr. Ross spoke with Alanna yesterday (Tuesday 5/27/25) after office hours and went over the results of her CT scan.